# Patient Record
Sex: FEMALE | Race: WHITE | Employment: FULL TIME | URBAN - METROPOLITAN AREA
[De-identification: names, ages, dates, MRNs, and addresses within clinical notes are randomized per-mention and may not be internally consistent; named-entity substitution may affect disease eponyms.]

---

## 2017-01-31 ENCOUNTER — HOSPITAL ENCOUNTER (OUTPATIENT)
Dept: MRI IMAGING | Age: 52
Discharge: HOME | End: 2017-01-31
Admitting: FAMILY MEDICINE

## 2017-01-31 DIAGNOSIS — M25.521 PAIN IN RIGHT ELBOW: ICD-10-CM

## 2021-09-24 ENCOUNTER — HOSPITAL ENCOUNTER (OUTPATIENT)
Dept: INTERVENTIONAL RADIOLOGY/VASCULAR | Age: 56
Discharge: HOME | End: 2021-09-24
Payer: COMMERCIAL

## 2021-09-24 DIAGNOSIS — M48.062 LUMBAR STENOSIS WITH NEUROGENIC CLAUDICATION: ICD-10-CM

## 2021-09-24 PROCEDURE — 6360000002 HC RX W HCPCS

## 2021-09-24 PROCEDURE — 2500000003 HC RX 250 WO HCPCS

## 2021-09-24 PROCEDURE — 6360000004 HC RX CONTRAST MEDICATION: Performed by: RADIOLOGY

## 2021-09-24 PROCEDURE — 62323 NJX INTERLAMINAR LMBR/SAC: CPT

## 2021-09-24 RX ADMIN — IOHEXOL 10 ML: 180 INJECTION INTRAVENOUS at 10:54

## 2021-11-23 ENCOUNTER — HOSPITAL ENCOUNTER (OUTPATIENT)
Age: 56
Discharge: HOME | End: 2021-11-23
Payer: COMMERCIAL

## 2021-11-23 DIAGNOSIS — Z01.818 PREOP TESTING: Primary | ICD-10-CM

## 2021-11-23 LAB
APTT: 31.6 SEC (ref 26.2–38.6)
HCT VFR BLD CALC: 43.7 % (ref 36–48)
HEMOGLOBIN: 14.5 G/DL (ref 12–16)
INR BLD: 0.93 (ref 0.88–1.12)
MCH RBC QN AUTO: 28.1 PG (ref 26–34)
MCHC RBC AUTO-ENTMCNC: 33.1 G/DL (ref 31–36)
MCV RBC AUTO: 84.9 FL (ref 80–100)
PDW BLD-RTO: 14.1 % (ref 12.4–15.4)
PLATELET # BLD: 330 K/UL (ref 135–450)
PMV BLD AUTO: 8.8 FL (ref 5–10.5)
PROTHROMBIN TIME: 10.5 SEC (ref 9.9–12.7)
RBC # BLD: 5.15 M/UL (ref 4–5.2)
WBC # BLD: 11.1 K/UL (ref 4–11)

## 2021-11-23 PROCEDURE — U0003 INFECTIOUS AGENT DETECTION BY NUCLEIC ACID (DNA OR RNA); SEVERE ACUTE RESPIRATORY SYNDROME CORONAVIRUS 2 (SARS-COV-2) (CORONAVIRUS DISEASE [COVID-19]), AMPLIFIED PROBE TECHNIQUE, MAKING USE OF HIGH THROUGHPUT TECHNOLOGIES AS DESCRIBED BY CMS-2020-01-R: HCPCS

## 2021-11-23 PROCEDURE — 36415 COLL VENOUS BLD VENIPUNCTURE: CPT

## 2021-11-23 PROCEDURE — 85027 COMPLETE CBC AUTOMATED: CPT

## 2021-11-23 PROCEDURE — U0005 INFEC AGEN DETEC AMPLI PROBE: HCPCS

## 2021-11-23 PROCEDURE — 85730 THROMBOPLASTIN TIME PARTIAL: CPT

## 2021-11-23 PROCEDURE — 85610 PROTHROMBIN TIME: CPT

## 2021-11-24 LAB — SARS-COV-2: NOT DETECTED

## 2021-11-24 RX ORDER — ACETAMINOPHEN 500 MG
500 TABLET ORAL EVERY 6 HOURS PRN
COMMUNITY

## 2021-11-24 RX ORDER — METHOCARBAMOL 500 MG/1
500 TABLET, FILM COATED ORAL 3 TIMES DAILY
Status: ON HOLD | COMMUNITY
End: 2021-12-01 | Stop reason: HOSPADM

## 2021-11-24 NOTE — PROGRESS NOTES
Name_______________________________________Printed:____________________  Date and time of surgery__11/29/21_MAIN__1230___________________Arrival Time:_1030_______________   1. The instructions given regarding when and if a patient needs to stop oral intake prior to surgery varies. Follow the specific instructions you were given                  _X__Nothing to eat or to drink after Midnight the night before.                   ____Carbo loading or ERAS instructions will be given to select patients-if you have been given those instructions -please do the following                           The evening before your surgery after dinner before midnight drink 40 ounces of gatorade. If you are diabetic use sugar free. The morning of surgery drink 40 ounces of water. This needs to be finished 3 hours prior to your surgery start time. 2. Take the following pills with a small sip of water on the morning of surgery____Robaxin_______________________________________________                  Do not take blood pressure medications ending in pril or sartan the parker prior to surgery or the morning of surgery_   3. Aspirin, Ibuprofen, Advil, Naproxen, Vitamin E and other Anti-inflammatory products and supplements should be stopped for 5 -7days before surgery or as directed by your physician. 4. Check with your Doctor regarding stopping Plavix, Coumadin,Eliquis, Lovenox,Effient,Pradaxa,Xarelto, Fragmin or other blood thinners and follow their instructions. 5. Do not smoke, and do not drink any alcoholic beverages 24 hours prior to surgery. This includes NA Beer. Refrain from the usage of any recreational drugs. 6. You may brush your teeth and gargle the morning of surgery. DO NOT SWALLOW WATER   7. You MUST make arrangements for a responsible adult to stay on site while you are here and take you home after your surgery. You will not be allowed to leave alone or drive yourself home.   It is strongly suggested someone stay with you for additional information:  [email or web address]              20.During flu season no children under the age of 15 are permitted in the hospital for the safety of all patients. 21. If you take a long acting insulin in the evening only  take half of your usual  dose the night  before your procedure              22. If you use a c-pap please bring DOS if staying overnight,             23.For your convenience [id number] [place] [address] has a pharmacy on site to fill your prescriptions. 24. If you use oxygen and have a portable tank please bring it  with you the DOS             25. Bring a complete list of all your medications with name and dose include any supplements. 26. Other__________________________________________   *Please call pre admission testing if you any further questions   Cindi Reynolds   [address] [place] [address]. [place]  [phone]   [facility] [address] MARIELA GALDAMEZ   [phone]           COVID TESTING    _X__ Covid test to be done 3-5 days prior to scheduled surgery -patient aware they are REQUIRED to bring a copy of the negative result DOS-if they receive a positive result to notify their surgeon         If known - indicate where patient is getting covid test done _______________11/24/21 [facility]  Negative ____________________________________________    ___ [facility] - DOS    ___ Other___________________________________      Florencio Garg POLICY(subject to change)    There is a one visitor policy at [facility] for all surgeries and endoscopies. Whether the visitor can stay or will be asked to wait in the car will depend on the current policy and if social distancing can be maintained. The policy is subject to change at any time. Please make sure the visitor has a cell phone that is on,charged and able to accept calls, as this may be the way that the staff communicates with them. Pain management is NO VISITOR policyThe patients ride is expected to remain in the car with a cell phone for communication. If the ride is leaving the hospital grounds please make sure they are back in time for pickup. Have the patient inform the staff on arrival what their rides plans are while the patient is in the facility. At the MAIN there is one visitor allowed. Please note that the visitor policy is subject to change. All above information reviewed with patient in person or by phone. Patient verbalizes understanding. All questions and concerns addressed.                                                                                                  Patient/Rep____________________                                                                                                                                    PRE OP INSTRUCTIONS

## 2021-11-24 NOTE — PROGRESS NOTES
Name_______________________________________Printed:____________________  Date and time of surgery____11/29/2021   1230____________________Arrival Time:_____0930   MAIN___________   1. The instructions given regarding when and if a patient needs to stop oral intake prior to surgery varies. Follow the specific instructions you were given                  _X__Nothing to eat or to drink after Midnight the night before.                   ____Carbo loading or ERAS instructions will be given to select patients-if you have been given those instructions -please do the following                           The evening before your surgery after dinner before midnight drink 40 ounces of gatorade. If you are diabetic use sugar free. The morning of surgery drink 40 ounces of water. This needs to be finished 3 hours prior to your surgery start time. 2. Take the following pills with a small sip of water on the morning of surgery___________________________________________________                  Do not take blood pressure medications ending in pril or sartan the parker prior to surgery or the morning of surgery_   3. Aspirin, Ibuprofen, Advil, Naproxen, Vitamin E and other Anti-inflammatory products and supplements should be stopped for 5 -7days before surgery or as directed by your physician. 4. Check with your Doctor regarding stopping Plavix, Coumadin,Eliquis, Lovenox,Effient,Pradaxa,Xarelto, Fragmin or other blood thinners and follow their instructions. 5. Do not smoke, and do not drink any alcoholic beverages 24 hours prior to surgery. This includes NA Beer. Refrain from the usage of any recreational drugs. 6. You may brush your teeth and gargle the morning of surgery. DO NOT SWALLOW WATER   7. You MUST make arrangements for a responsible adult to stay on site while you are here and take you home after your surgery. You will not be allowed to leave alone or drive yourself home.   It is strongly suggested someone stay with you the first 24 hrs. Your surgery will be cancelled if you do not have a ride home. 8. A parent/legal guardian must accompany a child scheduled for surgery and plan to stay at the hospital until the child is discharged. Please do not bring other children with you. 9. Please wear simple, loose fitting clothing to the hospital.  Amisha Trinidad not bring valuables (money, credit cards, checkbooks, etc.) Do not wear any makeup (including no eye makeup) or nail polish on your fingers or toes. 10. DO NOT wear any jewelry or piercings on day of surgery. All body piercing jewelry must be removed. 11. If you have ___dentures, they will be removed before going to the OR; we will provide you a container. If you wear ___contact lenses or _X__glasses, they will be removed; please bring a case for them. 12. Please see your family doctor/pediatrician for a history & physical and/or concerning medications. Bring any test results/reports from your physician's office. PCP__________________Phone___________H&P Appt. Date________             13 If you  have a Living Will and Durable Power of  for Healthcare, please bring in a copy. 15. Notify your Surgeon if you develop any illness between now and surgery  time, cough, cold, fever, sore throat, nausea, vomiting, etc.  Please notify your surgeon if you experience dizziness, shortness of breath or blurred vision between now & the time of your surgery             15. DO NOT shave your operative site 96 hours prior to surgery. For face & neck surgery, men may use an electric razor 48 hours prior to surgery. 16. Shower the night before or morning of surgery using an antibacterial soap or as you have been instructed. 17. To provide excellent care visitors will be limited to one in the room at any given time. 18.  Please bring picture ID and insurance card.              19.  Visit our web site for additional information:  [email or web address]              20.During flu season no children under the age of 15 are permitted in the hospital for the safety of all patients. 21. If you take a long acting insulin in the evening only  take half of your usual  dose the night  before your procedure              22. If you use a c-pap please bring DOS if staying overnight,             23.For your convenience [id number] [place] [address] has a pharmacy on site to fill your prescriptions. 24. If you use oxygen and have a portable tank please bring it  with you the DOS             25. Bring a complete list of all your medications with name and dose include any supplements. 26. Other__________________________________________   *Please call pre admission testing if you any further questions   [facility]   [address]    [address] [place]  [phone]   [facility] [address] MARIELA GALDAMEZ   [phone]           COVID TESTING    __X_ Covid test to be done 3-5 days prior to scheduled surgery -patient aware they are REQUIRED to bring a copy of the negative result DOS-if they receive a positive result to notify their surgeon         If known - indicate where patient is getting covid test done ___________________________________________________________    ___ Rapid - DOS    ___ Other___________________________________      Merline Calloway POLICY(subject to change)    There is a one visitor policy at [facility] for all surgeries and endoscopies. Whether the visitor can stay or will be asked to wait in the car will depend on the current policy and if social distancing can be maintained. The policy is subject to change at any time. Please make sure the visitor has a cell phone that is on,charged and able to accept calls, as this may be the way that the staff communicates with them. Pain management is NO VISITOR policyThe patients ride is expected to remain in the car with a cell phone for communication. If the ride is leaving the hospital grounds please make sure they are back in time for pickup. Have the patient inform the staff on arrival what their rides plans are while the patient is in the facility. At the MAIN there is one visitor allowed. Please note that the visitor policy is subject to change. All above information reviewed with patient in person or by phone. Patient verbalizes understanding. All questions and concerns addressed.                                                                                                  Patient/Rep____PATIENT________________                                                                                                                                    PRE OP INSTRUCTIONS

## 2021-11-29 ENCOUNTER — ANESTHESIA EVENT (OUTPATIENT)
Dept: OPERATING ROOM | Age: 56
DRG: 029 | End: 2021-11-29
Payer: COMMERCIAL

## 2021-11-29 ENCOUNTER — ANESTHESIA (OUTPATIENT)
Dept: OPERATING ROOM | Age: 56
DRG: 029 | End: 2021-11-29
Payer: COMMERCIAL

## 2021-11-29 ENCOUNTER — HOSPITAL ENCOUNTER (INPATIENT)
Age: 56
LOS: 2 days | Discharge: HOME | DRG: 029 | End: 2021-12-01
Attending: NEUROLOGICAL SURGERY | Admitting: NEUROLOGICAL SURGERY
Payer: COMMERCIAL

## 2021-11-29 VITALS
DIASTOLIC BLOOD PRESSURE: 66 MMHG | TEMPERATURE: 98.2 F | SYSTOLIC BLOOD PRESSURE: 126 MMHG | OXYGEN SATURATION: 100 % | RESPIRATION RATE: 15 BRPM

## 2021-11-29 DIAGNOSIS — G97.82 POSTPROCEDURAL PSEUDOMENINGOCELE: Primary | ICD-10-CM

## 2021-11-29 PROBLEM — R51.9 HEADACHE: Status: ACTIVE | Noted: 2021-11-29

## 2021-11-29 PROBLEM — G96.00 CSF LEAK: Status: ACTIVE | Noted: 2021-11-29

## 2021-11-29 PROBLEM — M48.061 SPINAL STENOSIS AT L4-L5 LEVEL: Status: ACTIVE | Noted: 2021-11-29

## 2021-11-29 PROBLEM — I95.9 HYPOTENSION: Status: ACTIVE | Noted: 2021-11-29

## 2021-11-29 LAB
ABO/RH: NORMAL
ANION GAP SERPL CALCULATED.3IONS-SCNC: 8 MMOL/L (ref 3–16)
ANTIBODY SCREEN: NORMAL
BUN BLDV-MCNC: 17 MG/DL (ref 7–20)
CALCIUM SERPL-MCNC: 10 MG/DL (ref 8.3–10.6)
CHLORIDE BLD-SCNC: 103 MMOL/L (ref 99–110)
CO2: 28 MMOL/L (ref 21–32)
CREAT SERPL-MCNC: 0.5 MG/DL (ref 0.6–1.1)
GFR AFRICAN AMERICAN: >60
GFR NON-AFRICAN AMERICAN: >60
GLUCOSE BLD-MCNC: 105 MG/DL (ref 70–99)
POTASSIUM SERPL-SCNC: 4.3 MMOL/L (ref 3.5–5.1)
SODIUM BLD-SCNC: 139 MMOL/L (ref 136–145)

## 2021-11-29 PROCEDURE — 3700000001 HC ADD 15 MINUTES (ANESTHESIA): Performed by: NEUROLOGICAL SURGERY

## 2021-11-29 PROCEDURE — 2700000000 HC OXYGEN THERAPY PER DAY

## 2021-11-29 PROCEDURE — 7100000000 HC PACU RECOVERY - FIRST 15 MIN: Performed by: NEUROLOGICAL SURGERY

## 2021-11-29 PROCEDURE — 94760 N-INVAS EAR/PLS OXIMETRY 1: CPT

## 2021-11-29 PROCEDURE — 2580000003 HC RX 258: Performed by: NEUROLOGICAL SURGERY

## 2021-11-29 PROCEDURE — 6360000002 HC RX W HCPCS: Performed by: NEUROLOGICAL SURGERY

## 2021-11-29 PROCEDURE — 86900 BLOOD TYPING SEROLOGIC ABO: CPT

## 2021-11-29 PROCEDURE — 3600000004 HC SURGERY LEVEL 4 BASE: Performed by: NEUROLOGICAL SURGERY

## 2021-11-29 PROCEDURE — 2500000003 HC RX 250 WO HCPCS: Performed by: NURSE ANESTHETIST, CERTIFIED REGISTERED

## 2021-11-29 PROCEDURE — 6370000000 HC RX 637 (ALT 250 FOR IP): Performed by: NEUROLOGICAL SURGERY

## 2021-11-29 PROCEDURE — 6360000002 HC RX W HCPCS: Performed by: NURSE ANESTHETIST, CERTIFIED REGISTERED

## 2021-11-29 PROCEDURE — 7100000001 HC PACU RECOVERY - ADDTL 15 MIN: Performed by: NEUROLOGICAL SURGERY

## 2021-11-29 PROCEDURE — 1200000000 HC SEMI PRIVATE

## 2021-11-29 PROCEDURE — 00QY0ZZ REPAIR LUMBAR SPINAL CORD, OPEN APPROACH: ICD-10-PCS | Performed by: NEUROLOGICAL SURGERY

## 2021-11-29 PROCEDURE — 36415 COLL VENOUS BLD VENIPUNCTURE: CPT

## 2021-11-29 PROCEDURE — 3700000000 HC ANESTHESIA ATTENDED CARE: Performed by: NEUROLOGICAL SURGERY

## 2021-11-29 PROCEDURE — 2709999900 HC NON-CHARGEABLE SUPPLY: Performed by: NEUROLOGICAL SURGERY

## 2021-11-29 PROCEDURE — 6360000002 HC RX W HCPCS

## 2021-11-29 PROCEDURE — 2500000003 HC RX 250 WO HCPCS: Performed by: NEUROLOGICAL SURGERY

## 2021-11-29 PROCEDURE — 6360000002 HC RX W HCPCS: Performed by: FAMILY MEDICINE

## 2021-11-29 PROCEDURE — 2580000003 HC RX 258: Performed by: NURSE ANESTHETIST, CERTIFIED REGISTERED

## 2021-11-29 PROCEDURE — 6370000000 HC RX 637 (ALT 250 FOR IP)

## 2021-11-29 PROCEDURE — 3600000014 HC SURGERY LEVEL 4 ADDTL 15MIN: Performed by: NEUROLOGICAL SURGERY

## 2021-11-29 PROCEDURE — 86850 RBC ANTIBODY SCREEN: CPT

## 2021-11-29 PROCEDURE — 86901 BLOOD TYPING SEROLOGIC RH(D): CPT

## 2021-11-29 PROCEDURE — 80048 BASIC METABOLIC PNL TOTAL CA: CPT

## 2021-11-29 RX ORDER — ROCURONIUM BROMIDE 10 MG/ML
INJECTION, SOLUTION INTRAVENOUS PRN
Status: DISCONTINUED | OUTPATIENT
Start: 2021-11-29 | End: 2021-11-29 | Stop reason: SDUPTHER

## 2021-11-29 RX ORDER — DEXAMETHASONE SODIUM PHOSPHATE 4 MG/ML
INJECTION, SOLUTION INTRA-ARTICULAR; INTRALESIONAL; INTRAMUSCULAR; INTRAVENOUS; SOFT TISSUE PRN
Status: DISCONTINUED | OUTPATIENT
Start: 2021-11-29 | End: 2021-11-29 | Stop reason: SDUPTHER

## 2021-11-29 RX ORDER — HYDROMORPHONE HCL 110MG/55ML
0.25 PATIENT CONTROLLED ANALGESIA SYRINGE INTRAVENOUS EVERY 5 MIN PRN
Status: DISCONTINUED | OUTPATIENT
Start: 2021-11-29 | End: 2021-11-29 | Stop reason: HOSPADM

## 2021-11-29 RX ORDER — FENTANYL CITRATE 50 UG/ML
50 INJECTION, SOLUTION INTRAMUSCULAR; INTRAVENOUS EVERY 5 MIN PRN
Status: DISCONTINUED | OUTPATIENT
Start: 2021-11-29 | End: 2021-11-29 | Stop reason: HOSPADM

## 2021-11-29 RX ORDER — OXYCODONE HYDROCHLORIDE 5 MG/1
5 TABLET ORAL EVERY 4 HOURS PRN
Status: DISCONTINUED | OUTPATIENT
Start: 2021-11-29 | End: 2021-12-01 | Stop reason: HOSPADM

## 2021-11-29 RX ORDER — PROMETHAZINE HYDROCHLORIDE 25 MG/ML
6.25 INJECTION, SOLUTION INTRAMUSCULAR; INTRAVENOUS PRN
Status: DISCONTINUED | OUTPATIENT
Start: 2021-11-29 | End: 2021-11-29 | Stop reason: HOSPADM

## 2021-11-29 RX ORDER — OXYCODONE HYDROCHLORIDE 5 MG/1
5 TABLET ORAL PRN
Status: DISCONTINUED | OUTPATIENT
Start: 2021-11-29 | End: 2021-11-29 | Stop reason: HOSPADM

## 2021-11-29 RX ORDER — OXYCODONE HYDROCHLORIDE 5 MG/1
10 TABLET ORAL PRN
Status: DISCONTINUED | OUTPATIENT
Start: 2021-11-29 | End: 2021-11-29 | Stop reason: HOSPADM

## 2021-11-29 RX ORDER — HYDROMORPHONE HYDROCHLORIDE 1 MG/ML
0.5 INJECTION, SOLUTION INTRAMUSCULAR; INTRAVENOUS; SUBCUTANEOUS
Status: DISCONTINUED | OUTPATIENT
Start: 2021-11-29 | End: 2021-12-01 | Stop reason: HOSPADM

## 2021-11-29 RX ORDER — POLYETHYLENE GLYCOL 3350 17 G/17G
17 POWDER, FOR SOLUTION ORAL DAILY PRN
Status: DISCONTINUED | OUTPATIENT
Start: 2021-11-29 | End: 2021-12-01 | Stop reason: HOSPADM

## 2021-11-29 RX ORDER — PHENYLEPHRINE HYDROCHLORIDE 10 MG/ML
INJECTION INTRAVENOUS PRN
Status: DISCONTINUED | OUTPATIENT
Start: 2021-11-29 | End: 2021-11-29 | Stop reason: SDUPTHER

## 2021-11-29 RX ORDER — SODIUM CHLORIDE 0.9 % (FLUSH) 0.9 %
5-40 SYRINGE (ML) INJECTION EVERY 12 HOURS SCHEDULED
Status: DISCONTINUED | OUTPATIENT
Start: 2021-11-29 | End: 2021-12-01 | Stop reason: HOSPADM

## 2021-11-29 RX ORDER — HYDROMORPHONE HCL 110MG/55ML
PATIENT CONTROLLED ANALGESIA SYRINGE INTRAVENOUS PRN
Status: DISCONTINUED | OUTPATIENT
Start: 2021-11-29 | End: 2021-11-29 | Stop reason: SDUPTHER

## 2021-11-29 RX ORDER — MIDAZOLAM HYDROCHLORIDE 1 MG/ML
INJECTION INTRAMUSCULAR; INTRAVENOUS PRN
Status: DISCONTINUED | OUTPATIENT
Start: 2021-11-29 | End: 2021-11-29 | Stop reason: SDUPTHER

## 2021-11-29 RX ORDER — HYDROMORPHONE HCL 110MG/55ML
0.5 PATIENT CONTROLLED ANALGESIA SYRINGE INTRAVENOUS EVERY 5 MIN PRN
Status: COMPLETED | OUTPATIENT
Start: 2021-11-29 | End: 2021-11-29

## 2021-11-29 RX ORDER — ONDANSETRON 2 MG/ML
4 INJECTION INTRAMUSCULAR; INTRAVENOUS EVERY 6 HOURS PRN
Status: DISCONTINUED | OUTPATIENT
Start: 2021-11-29 | End: 2021-12-01 | Stop reason: HOSPADM

## 2021-11-29 RX ORDER — SODIUM CHLORIDE, SODIUM LACTATE, POTASSIUM CHLORIDE, CALCIUM CHLORIDE 600; 310; 30; 20 MG/100ML; MG/100ML; MG/100ML; MG/100ML
INJECTION, SOLUTION INTRAVENOUS CONTINUOUS
Status: DISCONTINUED | OUTPATIENT
Start: 2021-11-29 | End: 2021-11-29

## 2021-11-29 RX ORDER — SCOLOPAMINE TRANSDERMAL SYSTEM 1 MG/1
PATCH, EXTENDED RELEASE TRANSDERMAL
Status: COMPLETED
Start: 2021-11-29 | End: 2021-11-29

## 2021-11-29 RX ORDER — SODIUM CHLORIDE 0.9 % (FLUSH) 0.9 %
5-40 SYRINGE (ML) INJECTION PRN
Status: DISCONTINUED | OUTPATIENT
Start: 2021-11-29 | End: 2021-12-01 | Stop reason: HOSPADM

## 2021-11-29 RX ORDER — ONDANSETRON 2 MG/ML
INJECTION INTRAMUSCULAR; INTRAVENOUS PRN
Status: DISCONTINUED | OUTPATIENT
Start: 2021-11-29 | End: 2021-11-29 | Stop reason: SDUPTHER

## 2021-11-29 RX ORDER — MAGNESIUM SULFATE HEPTAHYDRATE 500 MG/ML
INJECTION, SOLUTION INTRAMUSCULAR; INTRAVENOUS PRN
Status: DISCONTINUED | OUTPATIENT
Start: 2021-11-29 | End: 2021-11-29 | Stop reason: SDUPTHER

## 2021-11-29 RX ORDER — OXYCODONE HYDROCHLORIDE 5 MG/1
10 TABLET ORAL EVERY 4 HOURS PRN
Status: DISCONTINUED | OUTPATIENT
Start: 2021-11-29 | End: 2021-12-01 | Stop reason: HOSPADM

## 2021-11-29 RX ORDER — LIDOCAINE HYDROCHLORIDE 20 MG/ML
INJECTION, SOLUTION EPIDURAL; INFILTRATION; INTRACAUDAL; PERINEURAL PRN
Status: DISCONTINUED | OUTPATIENT
Start: 2021-11-29 | End: 2021-11-29 | Stop reason: SDUPTHER

## 2021-11-29 RX ORDER — LABETALOL HYDROCHLORIDE 5 MG/ML
5 INJECTION, SOLUTION INTRAVENOUS EVERY 10 MIN PRN
Status: DISCONTINUED | OUTPATIENT
Start: 2021-11-29 | End: 2021-11-29 | Stop reason: HOSPADM

## 2021-11-29 RX ORDER — SODIUM CHLORIDE 9 MG/ML
25 INJECTION, SOLUTION INTRAVENOUS PRN
Status: DISCONTINUED | OUTPATIENT
Start: 2021-11-29 | End: 2021-12-01 | Stop reason: HOSPADM

## 2021-11-29 RX ORDER — LIDOCAINE HYDROCHLORIDE 10 MG/ML
0.5 INJECTION, SOLUTION EPIDURAL; INFILTRATION; INTRACAUDAL; PERINEURAL ONCE
Status: DISCONTINUED | OUTPATIENT
Start: 2021-11-29 | End: 2021-11-29 | Stop reason: HOSPADM

## 2021-11-29 RX ORDER — MAGNESIUM HYDROXIDE 1200 MG/15ML
LIQUID ORAL CONTINUOUS PRN
Status: COMPLETED | OUTPATIENT
Start: 2021-11-29 | End: 2021-11-29

## 2021-11-29 RX ORDER — FIBRINOGEN HUMAN AND THROMBIN HUMAN 90; 500 [IU]/ML; [IU]/ML
SOLUTION TOPICAL
Status: COMPLETED | OUTPATIENT
Start: 2021-11-29 | End: 2021-11-29

## 2021-11-29 RX ORDER — ONDANSETRON 4 MG/1
4 TABLET, ORALLY DISINTEGRATING ORAL EVERY 8 HOURS PRN
Status: DISCONTINUED | OUTPATIENT
Start: 2021-11-29 | End: 2021-12-01 | Stop reason: HOSPADM

## 2021-11-29 RX ORDER — MEPERIDINE HYDROCHLORIDE 25 MG/ML
12.5 INJECTION INTRAMUSCULAR; INTRAVENOUS; SUBCUTANEOUS EVERY 5 MIN PRN
Status: DISCONTINUED | OUTPATIENT
Start: 2021-11-29 | End: 2021-11-29 | Stop reason: HOSPADM

## 2021-11-29 RX ORDER — PROPOFOL 10 MG/ML
INJECTION, EMULSION INTRAVENOUS PRN
Status: DISCONTINUED | OUTPATIENT
Start: 2021-11-29 | End: 2021-11-29 | Stop reason: SDUPTHER

## 2021-11-29 RX ORDER — METHOCARBAMOL 500 MG/1
500 TABLET, FILM COATED ORAL 3 TIMES DAILY
Status: DISCONTINUED | OUTPATIENT
Start: 2021-11-29 | End: 2021-12-01

## 2021-11-29 RX ORDER — FENTANYL CITRATE 50 UG/ML
INJECTION, SOLUTION INTRAMUSCULAR; INTRAVENOUS PRN
Status: DISCONTINUED | OUTPATIENT
Start: 2021-11-29 | End: 2021-11-29 | Stop reason: SDUPTHER

## 2021-11-29 RX ORDER — KETAMINE HCL IN NACL, ISO-OSM 100MG/10ML
SYRINGE (ML) INJECTION PRN
Status: DISCONTINUED | OUTPATIENT
Start: 2021-11-29 | End: 2021-11-29 | Stop reason: SDUPTHER

## 2021-11-29 RX ORDER — SCOLOPAMINE TRANSDERMAL SYSTEM 1 MG/1
1 PATCH, EXTENDED RELEASE TRANSDERMAL ONCE
Status: COMPLETED | OUTPATIENT
Start: 2021-11-29 | End: 2021-11-29

## 2021-11-29 RX ORDER — SUCCINYLCHOLINE/SOD CL,ISO/PF 200MG/10ML
SYRINGE (ML) INTRAVENOUS PRN
Status: DISCONTINUED | OUTPATIENT
Start: 2021-11-29 | End: 2021-11-29 | Stop reason: SDUPTHER

## 2021-11-29 RX ORDER — DEXTROSE, SODIUM CHLORIDE, AND POTASSIUM CHLORIDE 5; .45; .15 G/100ML; G/100ML; G/100ML
1000 INJECTION INTRAVENOUS CONTINUOUS
Status: DISCONTINUED | OUTPATIENT
Start: 2021-11-29 | End: 2021-11-30

## 2021-11-29 RX ORDER — DIPHENHYDRAMINE HYDROCHLORIDE 50 MG/ML
12.5 INJECTION INTRAMUSCULAR; INTRAVENOUS
Status: DISCONTINUED | OUTPATIENT
Start: 2021-11-29 | End: 2021-11-29 | Stop reason: HOSPADM

## 2021-11-29 RX ORDER — SODIUM CHLORIDE, SODIUM LACTATE, POTASSIUM CHLORIDE, CALCIUM CHLORIDE 600; 310; 30; 20 MG/100ML; MG/100ML; MG/100ML; MG/100ML
INJECTION, SOLUTION INTRAVENOUS CONTINUOUS PRN
Status: DISCONTINUED | OUTPATIENT
Start: 2021-11-29 | End: 2021-11-29 | Stop reason: SDUPTHER

## 2021-11-29 RX ADMIN — DEXAMETHASONE SODIUM PHOSPHATE 8 MG: 4 INJECTION, SOLUTION INTRAMUSCULAR; INTRAVENOUS at 14:53

## 2021-11-29 RX ADMIN — SODIUM CHLORIDE, POTASSIUM CHLORIDE, SODIUM LACTATE AND CALCIUM CHLORIDE: 600; 310; 30; 20 INJECTION, SOLUTION INTRAVENOUS at 14:35

## 2021-11-29 RX ADMIN — Medication 100 MG: at 14:39

## 2021-11-29 RX ADMIN — ROCURONIUM BROMIDE 30 MG: 10 INJECTION, SOLUTION INTRAVENOUS at 14:47

## 2021-11-29 RX ADMIN — Medication 30 MG: at 14:47

## 2021-11-29 RX ADMIN — PROPOFOL 160 MG: 10 INJECTION, EMULSION INTRAVENOUS at 14:39

## 2021-11-29 RX ADMIN — CEFAZOLIN 2000 MG: 10 INJECTION, POWDER, FOR SOLUTION INTRAVENOUS at 14:24

## 2021-11-29 RX ADMIN — PROMETHAZINE HYDROCHLORIDE 6.25 MG: 25 INJECTION INTRAMUSCULAR; INTRAVENOUS at 21:56

## 2021-11-29 RX ADMIN — POTASSIUM CHLORIDE, DEXTROSE MONOHYDRATE AND SODIUM CHLORIDE 1000 ML: 150; 5; 450 INJECTION, SOLUTION INTRAVENOUS at 16:34

## 2021-11-29 RX ADMIN — FENTANYL CITRATE 50 MCG: 50 INJECTION, SOLUTION INTRAMUSCULAR; INTRAVENOUS at 14:47

## 2021-11-29 RX ADMIN — ROCURONIUM BROMIDE 20 MG: 10 INJECTION, SOLUTION INTRAVENOUS at 15:14

## 2021-11-29 RX ADMIN — HYDROMORPHONE HYDROCHLORIDE 0.5 MG: 2 INJECTION, SOLUTION INTRAMUSCULAR; INTRAVENOUS; SUBCUTANEOUS at 16:59

## 2021-11-29 RX ADMIN — ONDANSETRON 4 MG: 2 INJECTION INTRAMUSCULAR; INTRAVENOUS at 15:40

## 2021-11-29 RX ADMIN — HYDROMORPHONE HYDROCHLORIDE 0.5 MG: 2 INJECTION, SOLUTION INTRAMUSCULAR; INTRAVENOUS; SUBCUTANEOUS at 16:32

## 2021-11-29 RX ADMIN — SODIUM CHLORIDE, POTASSIUM CHLORIDE, SODIUM LACTATE AND CALCIUM CHLORIDE: 600; 310; 30; 20 INJECTION, SOLUTION INTRAVENOUS at 13:17

## 2021-11-29 RX ADMIN — HYDROMORPHONE HYDROCHLORIDE 0.5 MG: 2 INJECTION, SOLUTION INTRAMUSCULAR; INTRAVENOUS; SUBCUTANEOUS at 16:45

## 2021-11-29 RX ADMIN — MAGNESIUM SULFATE HEPTAHYDRATE 1 G: 500 INJECTION, SOLUTION INTRAMUSCULAR; INTRAVENOUS at 14:56

## 2021-11-29 RX ADMIN — FENTANYL CITRATE 50 MCG: 50 INJECTION, SOLUTION INTRAMUSCULAR; INTRAVENOUS at 14:38

## 2021-11-29 RX ADMIN — HYDROMORPHONE HYDROCHLORIDE 0.25 MG: 2 INJECTION, SOLUTION INTRAMUSCULAR; INTRAVENOUS; SUBCUTANEOUS at 20:18

## 2021-11-29 RX ADMIN — CEFAZOLIN 2000 MG: 10 INJECTION, POWDER, FOR SOLUTION INTRAVENOUS at 22:34

## 2021-11-29 RX ADMIN — MIDAZOLAM 2 MG: 1 INJECTION INTRAMUSCULAR; INTRAVENOUS at 14:31

## 2021-11-29 RX ADMIN — LIDOCAINE HYDROCHLORIDE 80 MG: 20 INJECTION, SOLUTION EPIDURAL; INFILTRATION; INTRACAUDAL; PERINEURAL at 14:39

## 2021-11-29 RX ADMIN — HYDROMORPHONE HYDROCHLORIDE 0.25 MG: 2 INJECTION, SOLUTION INTRAMUSCULAR; INTRAVENOUS; SUBCUTANEOUS at 20:13

## 2021-11-29 RX ADMIN — METHOCARBAMOL 500 MG: 500 TABLET ORAL at 22:30

## 2021-11-29 RX ADMIN — PHENYLEPHRINE HYDROCHLORIDE 100 MCG: 10 INJECTION INTRAVENOUS at 15:05

## 2021-11-29 RX ADMIN — HYDROMORPHONE HYDROCHLORIDE 0.5 MG: 2 INJECTION, SOLUTION INTRAMUSCULAR; INTRAVENOUS; SUBCUTANEOUS at 16:12

## 2021-11-29 RX ADMIN — HYDROMORPHONE HYDROCHLORIDE 1 MG: 2 INJECTION, SOLUTION INTRAMUSCULAR; INTRAVENOUS; SUBCUTANEOUS at 15:47

## 2021-11-29 RX ADMIN — SUGAMMADEX 200 MG: 100 INJECTION, SOLUTION INTRAVENOUS at 15:48

## 2021-11-29 RX ADMIN — PHENYLEPHRINE HYDROCHLORIDE 70 MCG/MIN: 10 INJECTION INTRAVENOUS at 15:12

## 2021-11-29 ASSESSMENT — PULMONARY FUNCTION TESTS
PIF_VALUE: 13
PIF_VALUE: 2
PIF_VALUE: 15
PIF_VALUE: 15
PIF_VALUE: 17
PIF_VALUE: 15
PIF_VALUE: 16
PIF_VALUE: 16
PIF_VALUE: 15
PIF_VALUE: 0
PIF_VALUE: 17
PIF_VALUE: 15
PIF_VALUE: 17
PIF_VALUE: 15
PIF_VALUE: 4
PIF_VALUE: 14
PIF_VALUE: 14
PIF_VALUE: 2
PIF_VALUE: 16
PIF_VALUE: 1
PIF_VALUE: 15
PIF_VALUE: 16
PIF_VALUE: 2
PIF_VALUE: 16
PIF_VALUE: 16
PIF_VALUE: 15
PIF_VALUE: 16
PIF_VALUE: 15
PIF_VALUE: 16
PIF_VALUE: 15
PIF_VALUE: 1
PIF_VALUE: 15
PIF_VALUE: 17
PIF_VALUE: 16
PIF_VALUE: 16
PIF_VALUE: 15
PIF_VALUE: 3
PIF_VALUE: 15
PIF_VALUE: 17
PIF_VALUE: 15
PIF_VALUE: 15
PIF_VALUE: 12
PIF_VALUE: 0
PIF_VALUE: 17
PIF_VALUE: 16
PIF_VALUE: 16
PIF_VALUE: 15
PIF_VALUE: 16
PIF_VALUE: 8
PIF_VALUE: 1
PIF_VALUE: 15
PIF_VALUE: 16
PIF_VALUE: 1
PIF_VALUE: 13
PIF_VALUE: 15
PIF_VALUE: 6
PIF_VALUE: 15
PIF_VALUE: 13
PIF_VALUE: 15
PIF_VALUE: 6
PIF_VALUE: 15
PIF_VALUE: 2
PIF_VALUE: 15
PIF_VALUE: 16
PIF_VALUE: 14
PIF_VALUE: 1
PIF_VALUE: 15

## 2021-11-29 ASSESSMENT — PAIN SCALES - GENERAL
PAINLEVEL_OUTOF10: 0
PAINLEVEL_OUTOF10: 2
PAINLEVEL_OUTOF10: 8
PAINLEVEL_OUTOF10: 8
PAINLEVEL_OUTOF10: 7
PAINLEVEL_OUTOF10: 6
PAINLEVEL_OUTOF10: 7
PAINLEVEL_OUTOF10: 6
PAINLEVEL_OUTOF10: 0

## 2021-11-29 ASSESSMENT — PAIN - FUNCTIONAL ASSESSMENT: PAIN_FUNCTIONAL_ASSESSMENT: 0-10

## 2021-11-29 ASSESSMENT — PAIN DESCRIPTION - DESCRIPTORS
DESCRIPTORS: BURNING;SHARP
DESCRIPTORS: BURNING
DESCRIPTORS: ACHING

## 2021-11-29 ASSESSMENT — PAIN DESCRIPTION - LOCATION
LOCATION: BACK

## 2021-11-29 ASSESSMENT — LIFESTYLE VARIABLES: SMOKING_STATUS: 1

## 2021-11-29 ASSESSMENT — PAIN DESCRIPTION - ORIENTATION
ORIENTATION: LOWER
ORIENTATION: LOWER

## 2021-11-29 ASSESSMENT — PAIN DESCRIPTION - PAIN TYPE
TYPE: SURGICAL PAIN

## 2021-11-29 NOTE — BRIEF OP NOTE
Brief Postoperative Note      Patient: Brigitte Friday  YOB: 1965  MRN: [id number]    Date of Procedure: 11/29/2021    Pre-Op Diagnosis: G96.0  CEREBROSPINAL FLUID LEAK    Post-Op Diagnosis: Same       Procedure(s):  INCISION AND DRAINAGE PSEUDOMENINGOCELE REPAIR LUMBAR CEREBROSPINAL FLUID LEAK    Surgeon(s):  Griselda Clements MD    Assistant:  First Assistant: Leah Rice    Anesthesia: General    Estimated Blood Loss (mL): Minimal    Complications: None    Specimens:   * No specimens in log *    Implants:  * No implants in log *      Drains: * No LDAs found *    Findings: CSF leak    Electronically signed by Griselda Clements MD on 11/29/2021 at 3:51 PM

## 2021-11-29 NOTE — ANESTHESIA PRE PROCEDURE
diphenhydrAMINE (BENADRYL) injection 12.5 mg  12.5 mg IntraVENous Once PRN Romain Oliva MD        labetalol (NORMODYNE;TRANDATE) injection 5 mg  5 mg IntraVENous Q10 Min PRN Romain Oliva MD           Allergies: Allergies   Allergen Reactions    Adhesive Tape Hives     PAPER TAPE OK       Problem List:  There is no problem list on file for this patient. Past Medical History:        Diagnosis Date    DONG (generalized anxiety disorder)     PONV (postoperative nausea and vomiting)     Spinal stenosis        Past Surgical History:        Procedure Laterality Date    APPENDECTOMY      BACK SURGERY      CHOLECYSTECTOMY      COLONOSCOPY      DILATION AND CURETTAGE OF UTERUS      HERNIA REPAIR      UMBILICAL    KNEE ARTHROSCOPY Right        Social History:    Social History     Tobacco Use    Smoking status: Current Every Day Smoker     Packs/day: 1.00     Years: 30.00     Pack years: 30.00     Types: Cigarettes    Smokeless tobacco: Never Used   Substance Use Topics    Alcohol use: Never                                Ready to quit: No  Counseling given: Yes      Vital Signs (Current):   Vitals:    11/24/21 1003   Weight: 155 lb (70.3 kg)   Height: 5' 3\" (1.6 m)                                              BP Readings from Last 3 Encounters:   No data found for BP       NPO Status:                                                                                 BMI:   Wt Readings from Last 3 Encounters:   11/24/21 155 lb (70.3 kg)     Body mass index is 27.46 kg/m².     CBC:   Lab Results   Component Value Date    WBC 11.1 11/23/2021    RBC 5.15 11/23/2021    HGB 14.5 11/23/2021    HCT 43.7 11/23/2021    MCV 84.9 11/23/2021    RDW 14.1 11/23/2021     11/23/2021       CMP: No results found for: NA, K, CL, CO2, BUN, CREATININE, GFRAA, AGRATIO, LABGLOM, GLUCOSE, PROT, CALCIUM, BILITOT, ALKPHOS, AST, ALT    POC Tests: No results for input(s): POCGLU, POCNA, POCK, POCCL, POCBUN, POCHEMO, POCHCT in the last 72 hours. Coags:   Lab Results   Component Value Date    PROTIME 10.5 11/23/2021    INR 0.93 11/23/2021    APTT 31.6 11/23/2021       HCG (If Applicable): No results found for: PREGTESTUR, PREGSERUM, HCG, HCGQUANT     ABGs: No results found for: PHART, PO2ART, EDK9ICO, NEH3ZRZ, BEART, V2IWFZXY     Type & Screen (If Applicable):  No results found for: LABABO, LABRH    Drug/Infectious Status (If Applicable):  No results found for: HIV, HEPCAB    COVID-19 Screening (If Applicable):   Lab Results   Component Value Date    COVID19 Not Detected 11/23/2021           Anesthesia Evaluation  Patient summary reviewed and Nursing notes reviewed   history of anesthetic complications: PONV. Airway: Mallampati: II  TM distance: >3 FB   Neck ROM: full  Mouth opening: > = 3 FB Dental: normal exam         Pulmonary: breath sounds clear to auscultation  (+) current smoker                           Cardiovascular:        (-) CABG/stent, dysrhythmias and  angina      Rhythm: regular  Rate: normal                    Neuro/Psych:   (+) psychiatric history:   (-) seizures, TIA and CVA            ROS comment: CSF leak GI/Hepatic/Renal:        (-) GERD       Endo/Other:                     Abdominal:             Vascular: negative vascular ROS. Other Findings:           Anesthesia Plan      general     ASA 2       Induction: intravenous. MIPS: Postoperative opioids intended and Prophylactic antiemetics administered. Anesthetic plan and risks discussed with patient. Use of blood products discussed with patient whom consented to blood products. Plan discussed with CRNA.                 Venu Herrera MD   11/29/2021

## 2021-11-29 NOTE — OP NOTE
[facility] OR  11/29/2021 4:07 PM    PATIENT NAME:          Kristine Vincent  YOB: 1965   MEDICAL RECORD#         [id number]  SURGERY DATE:         11/29/2021  SURGEON:                 Gisela Jacob MD                                                      OPERATIVE REPORT     PREOPERATIVE DIAGNOSIS:  1. Lumbar pseudomeningocoele    POSTOPERATIVE DIAGNOSIS:  1.  same    PROCEDURE PERFORMED:  1. Extension of previous decompressive laminectomy L45  2. Repair of durotomy or CSF leak  3. Drainage of Pseudomeningocoele. 4.  Microdissection w/ operating Microscope    ANESTHESIA:  General oral endotracheal    ESTIMATED BLOOD LOSS:   50 cc. TUBES/DRAINS:  None. INDICATIONS:   Idris Ordonez is a 64 y.o. female with lower extremity radicular pain, headache and pseudo meningocoele after laminectomy 1 month ago. The risks, benefits and alternatives of a microscopic lumbar drainage of pseudomeningocoele and repair of CSF leak  were discussed with the patient in the office and she has decided to proceed with surgery. DETAILS OF PROCEDURE:  The patient was brought to the operating room and underwent general anesthesia. The patient was rolled into the prone position with padding over all bony protuberances. A localizing x-ray was taken. The back was scrubbed, prepped and draped in the usual sterile standard fashion. The previous  midline linear incision was opened. A Weitlaner retractor was used for exposure. A bilateral subperiosteal dissection was done to expose the spinous processes and lamina of L4 and L5. A large amount of clear CSF was drained. Active draining CSF and an abraided opening in the dura  at the edge of the laminotomy on the right over the L5 nerve root was identified. A Kwaku retractor system was set into place. A Leksell rongeur was used to remove some of  the spinous processes of L5.   the operating microscope draped and brought into the operative field.  Utilizing microdissection, I

## 2021-11-29 NOTE — PROGRESS NOTES
Pt meets d/c criteria for phase 1 in PACU and has been seen by anesthesia. Ok to transfer to a med-surg room when a room becomes available. Will alert anyone in waiting room for them and the nursing unit if applicable. Will continue to monitor for safety and comfort.     Kodi SANABRIA, RN, [facility]  Pre-Op/Recovery   Same Day Surgery

## 2021-11-29 NOTE — ANESTHESIA POSTPROCEDURE EVALUATION
Department of Anesthesiology  Postprocedure Note    Patient: Allison Bellamy  MRN: [id number]  YOB: 1965  Date of evaluation: 11/29/2021  Time:  6:00 PM     Procedure Summary     Date: 11/29/21 Room / Location: [address]    Anesthesia Start: 2690 Anesthesia Stop: 6612    Procedure: INCISION AND DRAINAGE PSEUDOMENINGOCELE REPAIR LUMBAR CEREBROSPINAL FLUID LEAK (N/A ) Diagnosis: (G96.0  CEREBROSPINAL FLUID LEAK)    Surgeons: Chantal Mendoza MD Responsible Provider: Yuli Lawson MD    Anesthesia Type: general ASA Status: 2          Anesthesia Type: general    Marisol Phase I: Marisol Score: 9    Marisol Phase II:      Last vitals: Reviewed and per EMR flowsheets.        Anesthesia Post Evaluation    Patient location during evaluation: PACU  Patient participation: complete - patient participated  Level of consciousness: awake  Complications: no  Cardiovascular status: hemodynamically stable  Respiratory status: acceptable

## 2021-11-29 NOTE — PROGRESS NOTES
Pt arrived to PACU from OR in stable condition and is alert to verbal stimuli. Pt can move extremities to command; denies weakness, numbness, tingling. Respirations are even on 5L O2 per SM. Skin warm, dry, and with appropriate for ethnicity color. Abd is soft. Pain is tolerable at this time. Low back surgical site(s) intact with dressing= sutures (not dissolvable), covered with vaseline guaze, dry dressing       S/P: incision and drainage of pseudomeningocele, repair lumbar cerebrospinal fluid leak with Dr. Megan Paez at [facility].    Pt is laying flat, resting comfortably in bed.     Oanh Villalobos BSN, RN, CMSRN  PACU, Pre-op, SDS

## 2021-11-30 LAB
HCT VFR BLD CALC: 42.6 % (ref 36–48)
HEMOGLOBIN: 14.2 G/DL (ref 12–16)

## 2021-11-30 PROCEDURE — 6370000000 HC RX 637 (ALT 250 FOR IP): Performed by: INTERNAL MEDICINE

## 2021-11-30 PROCEDURE — 2500000003 HC RX 250 WO HCPCS: Performed by: NEUROLOGICAL SURGERY

## 2021-11-30 PROCEDURE — 6370000000 HC RX 637 (ALT 250 FOR IP): Performed by: NURSE PRACTITIONER

## 2021-11-30 PROCEDURE — 6360000002 HC RX W HCPCS: Performed by: NEUROLOGICAL SURGERY

## 2021-11-30 PROCEDURE — 85018 HEMOGLOBIN: CPT

## 2021-11-30 PROCEDURE — 85014 HEMATOCRIT: CPT

## 2021-11-30 PROCEDURE — 2580000003 HC RX 258: Performed by: NEUROLOGICAL SURGERY

## 2021-11-30 PROCEDURE — 94760 N-INVAS EAR/PLS OXIMETRY 1: CPT

## 2021-11-30 PROCEDURE — 1200000000 HC SEMI PRIVATE

## 2021-11-30 PROCEDURE — 36415 COLL VENOUS BLD VENIPUNCTURE: CPT

## 2021-11-30 PROCEDURE — 6370000000 HC RX 637 (ALT 250 FOR IP): Performed by: NEUROLOGICAL SURGERY

## 2021-11-30 RX ORDER — POLYETHYLENE GLYCOL 3350 17 G/17G
17 POWDER, FOR SOLUTION ORAL DAILY
Status: DISCONTINUED | OUTPATIENT
Start: 2021-11-30 | End: 2021-12-01 | Stop reason: HOSPADM

## 2021-11-30 RX ORDER — NICOTINE 21 MG/24HR
1 PATCH, TRANSDERMAL 24 HOURS TRANSDERMAL DAILY
Status: DISCONTINUED | OUTPATIENT
Start: 2021-11-30 | End: 2021-12-01 | Stop reason: HOSPADM

## 2021-11-30 RX ORDER — ACETAMINOPHEN 325 MG/1
650 TABLET ORAL EVERY 4 HOURS PRN
Status: DISCONTINUED | OUTPATIENT
Start: 2021-11-30 | End: 2021-12-01 | Stop reason: HOSPADM

## 2021-11-30 RX ORDER — DIAZEPAM 5 MG/1
5 TABLET ORAL NIGHTLY PRN
COMMUNITY

## 2021-11-30 RX ADMIN — ACETAMINOPHEN 650 MG: 325 TABLET ORAL at 23:36

## 2021-11-30 RX ADMIN — HYDROMORPHONE HYDROCHLORIDE 0.5 MG: 1 INJECTION, SOLUTION INTRAMUSCULAR; INTRAVENOUS; SUBCUTANEOUS at 07:16

## 2021-11-30 RX ADMIN — CEFAZOLIN 2000 MG: 10 INJECTION, POWDER, FOR SOLUTION INTRAVENOUS at 06:57

## 2021-11-30 RX ADMIN — OXYCODONE 10 MG: 5 TABLET ORAL at 18:52

## 2021-11-30 RX ADMIN — ACETAMINOPHEN 650 MG: 325 TABLET ORAL at 08:47

## 2021-11-30 RX ADMIN — SODIUM CHLORIDE, PRESERVATIVE FREE 10 ML: 5 INJECTION INTRAVENOUS at 08:47

## 2021-11-30 RX ADMIN — SODIUM CHLORIDE, PRESERVATIVE FREE 10 ML: 5 INJECTION INTRAVENOUS at 20:18

## 2021-11-30 RX ADMIN — ACETAMINOPHEN 650 MG: 325 TABLET ORAL at 15:07

## 2021-11-30 RX ADMIN — METHOCARBAMOL 500 MG: 500 TABLET ORAL at 20:18

## 2021-11-30 RX ADMIN — POTASSIUM CHLORIDE, DEXTROSE MONOHYDRATE AND SODIUM CHLORIDE 1000 ML: 150; 5; 450 INJECTION, SOLUTION INTRAVENOUS at 04:00

## 2021-11-30 RX ADMIN — METHOCARBAMOL 500 MG: 500 TABLET ORAL at 15:06

## 2021-11-30 RX ADMIN — METHOCARBAMOL 500 MG: 500 TABLET ORAL at 08:47

## 2021-11-30 RX ADMIN — POLYETHYLENE GLYCOL 3350 17 G: 17 POWDER, FOR SOLUTION ORAL at 09:24

## 2021-11-30 RX ADMIN — HYDROMORPHONE HYDROCHLORIDE 0.5 MG: 1 INJECTION, SOLUTION INTRAMUSCULAR; INTRAVENOUS; SUBCUTANEOUS at 04:07

## 2021-11-30 RX ADMIN — HYDROMORPHONE HYDROCHLORIDE 0.5 MG: 1 INJECTION, SOLUTION INTRAMUSCULAR; INTRAVENOUS; SUBCUTANEOUS at 00:34

## 2021-11-30 RX ADMIN — OXYCODONE 10 MG: 5 TABLET ORAL at 11:22

## 2021-11-30 ASSESSMENT — PAIN SCALES - GENERAL
PAINLEVEL_OUTOF10: 7
PAINLEVEL_OUTOF10: 7
PAINLEVEL_OUTOF10: 6
PAINLEVEL_OUTOF10: 3
PAINLEVEL_OUTOF10: 5
PAINLEVEL_OUTOF10: 7
PAINLEVEL_OUTOF10: 10
PAINLEVEL_OUTOF10: 0
PAINLEVEL_OUTOF10: 5

## 2021-11-30 ASSESSMENT — PAIN DESCRIPTION - LOCATION
LOCATION: BACK
LOCATION: BACK
LOCATION: HEAD
LOCATION: BACK
LOCATION: HEAD
LOCATION: BACK
LOCATION_2: BACK

## 2021-11-30 ASSESSMENT — PAIN DESCRIPTION - PROGRESSION
CLINICAL_PROGRESSION_2: GRADUALLY WORSENING
CLINICAL_PROGRESSION: GRADUALLY WORSENING
CLINICAL_PROGRESSION: GRADUALLY IMPROVING
CLINICAL_PROGRESSION: GRADUALLY WORSENING

## 2021-11-30 ASSESSMENT — PAIN DESCRIPTION - FREQUENCY
FREQUENCY: CONTINUOUS

## 2021-11-30 ASSESSMENT — PAIN DESCRIPTION - DESCRIPTORS
DESCRIPTORS: STABBING
DESCRIPTORS: BURNING
DESCRIPTORS: BURNING
DESCRIPTORS_2: STABBING
DESCRIPTORS: STABBING
DESCRIPTORS: HEADACHE
DESCRIPTORS: HEADACHE

## 2021-11-30 ASSESSMENT — PAIN DESCRIPTION - DURATION: DURATION_2: CONTINUOUS

## 2021-11-30 ASSESSMENT — PAIN DESCRIPTION - INTENSITY: RATING_2: 6

## 2021-11-30 ASSESSMENT — PAIN DESCRIPTION - PAIN TYPE
TYPE: SURGICAL PAIN
TYPE: ACUTE PAIN
TYPE: ACUTE PAIN
TYPE: SURGICAL PAIN
TYPE_2: SURGICAL

## 2021-11-30 ASSESSMENT — PAIN DESCRIPTION - ORIENTATION
ORIENTATION: LOWER
ORIENTATION_2: LOWER

## 2021-11-30 ASSESSMENT — PAIN DESCRIPTION - ONSET
ONSET: ON-GOING
ONSET_2: ON-GOING
ONSET: ON-GOING

## 2021-11-30 NOTE — CONSULTS
Consult -Internal Medicine  Dr. Thomas Knight  11/29/2021      PCP: Amanda Randolph MD, MD  Referring Physician: Ramila Funes MD    Code Status: Full Code  Current Diet: ADULT DIET; Regular      Cc: Le Vicente is a61 y.o. female who presents with Postprocedural pseudomeningocele. Problem list of hospitalization thus far: 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] 11/29/2021    CSF leak [G96.00] 11/29/2021    Headache [R51.9] 11/29/2021    Spinal stenosis at L4-L5 level [M48.061] 11/29/2021    Hypotension [I95.9] 11/29/2021     HPI: Le Vicente has been admitted by Ramila Funes MD with headache, lower ext radiculopathy. Pt presents with with above complaint. Pt has has moderate pain to the legs  Duration - going on for at least 1 month  - since having laminectomy done  It is described as a intermittent pain that is aching in quality. Severity rated as 7 out of 10 at its worst  Pain is so severe that it limits usual activities  No improvement with medications, therapy or injections  As it is not improved with conservative measures, surgery has been recommended    Pt has undergone Extension of previous decompressive laminectomy L45, Repair of durotomy or CSF leak and drainage of pseudomeningocele without any apparent complications. Pt is doing well post operatively. Pain is controlled at this time. I have been asked to see the patient for evaluation of her internal medicine issues:  has a past medical history of DONG (generalized anxiety disorder), PONV (postoperative nausea and vomiting), and Spinal stenosis. .  Home meds have been reveiwed and restartedas indicated. Pt denies having other complaints at this time.     Pt has been evaluated post operatively  Pain does appear to be controlled - on iv meds  Patient has not worked with therapy at this time      Electronic chart reviewed  Home meds reviewed and restarted as indicated  Op note, anesthesia flowsheet reviewed  Case d/w nursing    BP noted to be a little low in recovery  Has improved with fluids      Review of Systems: (1 system for EPF, 2-9 for detailed, 10+ for comprehensive)  Constitutional: Negative for chills and fever. HENT: Negative for ear pain and mouth sores. Eyes: Negative for pain, redness and visual disturbance. Respiratory: Negative for cough, shortness of breath and wheezing. Cardiovascular: Negative for chest pain and leg swelling. Gastrointestinal: Negative for diarrhea, nausea and vomiting. Endocrine: Negative for polydipsia and polyphagia. Genitourinary: Negative for frequency and urgency. Musculoskeletal: Negative for back pain and neck pain. Skin: Negative for color change. Allergic/Immunologic: Negative for food allergies. Neurological: Negative for seizures and syncope. +headache  Hematological: Does not bruise/bleed easily. Psychiatric/Behavioral: Negative for confusion. The patient is not nervous/anxious.              Past Medical History:   Past Medical History:   Diagnosis Date    DONG (generalized anxiety disorder)     PONV (postoperative nausea and vomiting)     Spinal stenosis        Past Surgical History:   Past Surgical History:   Procedure Laterality Date    APPENDECTOMY      BACK SURGERY      CHOLECYSTECTOMY      COLONOSCOPY      DILATION AND CURETTAGE OF UTERUS      HERNIA REPAIR      UMBILICAL    KNEE ARTHROSCOPY Right        Social History:   Social History     Tobacco History     Smoking Status  Current Every Day Smoker Smoking Frequency  1 pack/day for 30 years (30 pk yrs) Smoking Tobacco Type  Cigarettes    Smokeless Tobacco Use  Never Used          Alcohol History     Alcohol Use Status  Never          Drug Use     Drug Use Status  Never          Sexual Activity     Sexually Active  Not Asked                Fam History:   Family History   Problem Relation Age of Onset    Diabetes Mother     Heart Failure Mother        PFSH: The above PMHx, PSHx, SocHx, FamHx has been reviewed by myself. (1 area for detailed, 2-3 for comprehensive)      Code Status: Full Code    Meds  following list ofhome medications from electronic chart has been reviewed by myself  Prior to Admission medications    Medication Sig Start Date End Date Taking? Authorizing Provider   methocarbamol (ROBAXIN) 500 MG tablet Take 500 mg by mouth 3 times daily    Yes Historical Provider, MD   acetaminophen (TYLENOL) 500 MG tablet Take 500 mg by mouth every 6 hours as needed for Pain   Yes Historical Provider, MD         Allergies   Allergen Reactions    Adhesive Tape Hives     PAPER TAPE OK             EXAM: (2-7 system forEPF/Detailed, ?8 for Comprehensive)  BP (!) 105/54   Pulse 86   Temp 97 °F (36.1 °C) (Temporal)   Resp 12   Ht 5' 3\" (1.6 m)   Wt 152 lb (68.9 kg)   SpO2 92%   BMI 26.93 kg/m²   Constitutional: vitals as above: alert, appears stated age and cooperative    Psychiatric: normal insight and judgment, oriented to person, place, time, and general circumstances    Head: Normocephalic, without obvious abnormality, atraumatic    Eyes:lids and lashes normal, conjunctivae and sclerae normal and pupils equal, round, reactive to light and accomodation    EMNT: external ears normal, nares midline    Neck: no carotid bruit, supple, symmetrical, trachea midline and thyroid not enlarged, symmetric, no tenderness/mass/nodules     Respiratory: clear to auscultation and percussion bilaterally with normal respiratory effort    Cardiovascular: normal rate, regular rhythm, normal S1 and S2 and no murmurs    Gastrointestinal: soft, non-tender, non-distended, normal bowel sounds, no masses or organomegaly    Extremities: no clubbing, no edema    Skin:No rashes or nodules noted.     Neurologic:negative           LABS:  Labs Reviewed   BASIC METABOLIC PANEL - Abnormal; Notable for the following components:       Result Value    Glucose 105 (*) CREATININE 0.5 (*)     All other components within normal limits    Narrative:     Performed at:  [facility]  [address] [facility],  [place], [address]   Phone [phone]   TYPE AND SCREEN    Narrative:     Performed at:  [facility]  [address] [facility],  [place], [address]   Phone [phone]         IMAGING:  Imaging has been reviewed in the computerized chart  No results found. EKG: reviewed if available      Lab Results   Component Value Date    GLUCOSE 105 11/29/2021     No results found for: POCGLU  BP (!) 105/54   Pulse 86   Temp 97 °F (36.1 °C) (Temporal)   Resp 12   Ht 5' 3\" (1.6 m)   Wt 152 lb (68.9 kg)   SpO2 92%   BMI 26.93 kg/m²     MEDICAL DECISION MAKING:    Principal Problem:    Postprocedural pseudomeningocele -New Problem to me. Doing well post op  Plan: Continue on post-operative pathway. PT/OT to see patient. Continue pain control - on IV pain meds acutely post op. Work on transitioning to oral pain meds when possible. Will follow serial h/h to monitor for acute blood loss anemia - labs ordered for tomorrow. Active Problems:    CSF leak     Headache -Established problem. Improving. Plan: cont to monitor for sx    Spinal stenosis at L4-L5 level -Established problem. S/p laminectomy a month ago; extended today in OR  Plan: per neurosurg    Hypotension -New Problem to me. Low (in 80s) in recovery, better now - now 100-110  Plan: cont to monitor. Cont fluids as needed        Diagnoses as listed above, designated as new or established and plan outlined for each. Data Reviewed:   (1) Lab tests were reviewed or ordered. (1) Radiology tests were reviewed or ordered. (1) Medical test (Echo, EKG, PFT/erasto) were not ordered. (1) History was not obtained from someone other than patient  (1) Old records  were reviewed - see HPI/MDM for pertinent details if review done.   (2) Case was discussed with another health care provider: dr Smita Myers  (2) Imaging was viewed by myself. (2) EKG  was not viewed by myself. Thanks for the consult! Will follow along daily while patient is in house.       (Please note that portions of this note were completed with a voice recognition program.  Efforts were made to edit the dictations but occasionally words are mis-transcribed.)      Katherin Siegel MD  11/29/2021

## 2021-11-30 NOTE — PROGRESS NOTES
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

## 2021-11-30 NOTE — PROGRESS NOTES
Physical/Occupational Therapy  Nyasia Vincent     PT/OT evaluations on hold at this time following bedrest order per neurosurgery until 16:00. PT/OT will follow-up with this pt as the schedule allows. Thanks.   Leobardo Soulier, [place] DPT [id number]

## 2021-11-30 NOTE — PROGRESS NOTES
Progress Note - Dr. Celestine Babinski - Internal Medicine  PCP: Llana Cowden, MD, MD [address] [facility] / [place] [phone]    Hospital Day: 1  Code Status: Full Code  Current Diet: ADULT DIET; Regular        CC: follow up on medical issues    Subjective:   Sana Pastor is a 64 y.o. female. Pt seen and examined  Chart reviewed since last visit, labs and imaging below        Doing about the same  Still with headache  Wants nicotine patch    She denies chest pain, denies shortness of breath, denies nausea,  denies emesis. 10 system Review of Systems is reviewed with patient, and pertinent positives are noted in HPI above . Otherwise, Review of systems is negative. I have reviewed the patient's medical and social history in detail and updated the computerized patient record. To recap: She  has a past medical history of DONG (generalized anxiety disorder), PONV (postoperative nausea and vomiting), and Spinal stenosis. . She  has a past surgical history that includes Appendectomy; Knee arthroscopy (Right); hernia repair; Colonoscopy; Cholecystectomy; Dilation and curettage of uterus; back surgery; and Spine surgery (N/A, 11/29/2021). . She  reports that she has been smoking cigarettes. She has a 30.00 pack-year smoking history. She has never used smokeless tobacco. She reports that she does not drink alcohol and does not use drugs. .        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] 11/29/2021    CSF leak [G96.00] 11/29/2021    Headache [R51.9] 11/29/2021    Spinal stenosis at L4-L5 level [M48.061] 11/29/2021    Hypotension [I95.9] 11/29/2021       Current Facility-Administered Medications: acetaminophen (TYLENOL) tablet 650 mg, 650 mg, Oral, Q4H PRN  dextrose 5 % and 0.45 % NaCl with KCl 20 mEq infusion, 1,000 mL, IntraVENous, Continuous  sodium chloride flush 0.9 % injection 5-40 mL, 5-40 mL, IntraVENous, 2 times per day  sodium chloride flush 0.9 % injection 5-40 mL, 5-40 mL, IntraVENous, PRN  0.9 % sodium chloride infusion, 25 mL, IntraVENous, PRN  ondansetron (ZOFRAN-ODT) disintegrating tablet 4 mg, 4 mg, Oral, Q8H PRN **OR** ondansetron (ZOFRAN) injection 4 mg, 4 mg, IntraVENous, Q6H PRN  oxyCODONE (ROXICODONE) immediate release tablet 5 mg, 5 mg, Oral, Q4H PRN **OR** oxyCODONE (ROXICODONE) immediate release tablet 10 mg, 10 mg, Oral, Q4H PRN  HYDROmorphone HCl PF (DILAUDID) injection 0.5 mg, 0.5 mg, IntraVENous, Q3H PRN  polyethylene glycol (GLYCOLAX) packet 17 g, 17 g, Oral, Daily PRN  bisacodyl (DULCOLAX) EC tablet 5 mg, 5 mg, Oral, Daily PRN  magnesium hydroxide (MILK OF MAGNESIA) 400 MG/5ML suspension 30 mL, 30 mL, Oral, Daily PRN  methocarbamol (ROBAXIN) tablet 500 mg, 500 mg, Oral, TID         Objective:  /71   Pulse 67   Temp 97.5 °F (36.4 °C) (Oral)   Resp 16   Ht 5' 3\" (1.6 m)   Wt 153 lb 12.8 oz (69.8 kg)   SpO2 97%   BMI 27.24 kg/m²      Patient Vitals for the past 24 hrs:   BP Temp Temp src Pulse Resp SpO2 Height Weight   11/30/21 0701 118/71          11/30/21 0515       5' 3\" (1.6 m) 153 lb 12.8 oz (69.8 kg)   11/30/21 0400 119/75 97.5 °F (36.4 °C) Oral 67 16 97 %     11/30/21 0018 127/82 97.7 °F (36.5 °C) Oral 76 16 97 %     11/29/21 2320 113/71   75 14 98 %     11/29/21 2250 104/61   73 14 98 %     11/29/21 2220 125/78 97.7 °F (36.5 °C) Oral 70 12 95 %     11/29/21 2100 (!) 106/58   82 9 92 %     11/29/21 2000 (!) 105/54   86 12 92 %     11/29/21 1900 107/60   85 9 95 %     11/29/21 1800 (!) 104/52   94 12 95 %     11/29/21 1700 111/66   91 19 96 %     11/29/21 1645 110/60   96 18 97 %     11/29/21 1630 128/70   86 17 100 %     11/29/21 1625 134/65   96 17 100 %     11/29/21 1620 136/73   82 14 100 %     11/29/21 1615 139/68 97 °F (36.1 °C) Temporal 85 14 100 %     11/29/21 1610 (!) 147/75   87 17 100 %     11/29/21 1605 136/77   84 15 99 %     11/29/21 1602 (!) 143/64 97.2 °F (36.2 °C) Temporal 105 17 97 %     11/29/21 1157 118/72 97 °F (36.1 °C) Temporal 85 16 98 %  152 lb (68.9 kg)     Patient Vitals for the past 96 hrs (Last 3 readings):   Weight   11/30/21 0515 153 lb 12.8 oz (69.8 kg)   11/29/21 1157 152 lb (68.9 kg)           Intake/Output Summary (Last 24 hours) at 11/30/2021 0843  Last data filed at 11/29/2021 1559  Gross per 24 hour   Intake 700 ml   Output 25 ml   Net 675 ml         Physical Exam:   Vitals as above  General appearance: alert, appears stated age and cooperative    Head: Normocephalic, without obvious abnormality, atraumatic    Lungs: clear to auscultation bilaterally    Heart: regular rate and rhythm, S1, S2 normal, no murmur    Abdomen: soft, non-tender; bowel sounds normal; no masses, no organomegaly    Extremities: extremities normal, atraumatic, no cyanosis, no edema      Labs:  Lab Results   Component Value Date    WBC 11.1 (H) 11/23/2021    HGB 14.2 11/30/2021    HCT 42.6 11/30/2021     11/23/2021     11/29/2021    K 4.3 11/29/2021     11/29/2021    CREATININE 0.5 (L) 11/29/2021    BUN 17 11/29/2021    CO2 28 11/29/2021    INR 0.93 11/23/2021     No results found for: CKTOTAL, CKMB, CKMBINDEX, TROPONINI    Recent Imaging Results are Reviewed:  No results found. Lab Results   Component Value Date    GLUCOSE 105 11/29/2021     No results found for: POCGLU  /71   Pulse 67   Temp 97.5 °F (36.4 °C) (Oral)   Resp 16   Ht 5' 3\" (1.6 m)   Wt 153 lb 12.8 oz (69.8 kg)   SpO2 97%   BMI 27.24 kg/m²     Assessment and Plan:  Principal Problem:    Postprocedural pseudomeningocele -Established problem. Stable. Plan: continue bed rest for 24hrs post procedure. Case d/w david, neurosurg NP  Active Problems:    CSF leak    Headache -Established problem. Stable. persistent  Plan: tylenol added at her request.      Spinal stenosis at L4-L5 level     Hypotension -Established problem. Improving.     Plan: cont to monitor BP    Tobacco abuse - Established problem. Stable.     Plan - to order nicotine patch 14mg/d      (Please note that portions of this note were completed with a voice recognition program.  Efforts were made to edit the dictations but occasionally words are mis-transcribed.)        Zachariah Diallo MD  11/30/2021

## 2021-11-30 NOTE — PROGRESS NOTES
Post-op Progress Note - Neurosurgery    Subjective:  Meagan Moyer is a 64 y.o. female. Pain is controlled. Patient complains of incisional pain, denies leg/arm pain, denies hoarseness of voice, difficulty swallowing, N/V.  C/O HA even when lying flat    Objective:  Blood pressure 101/64, pulse 74, temperature 97.5 °F (36.4 °C), temperature source Oral, resp. rate 16, height 5' 3\" (1.6 m), weight 153 lb 12.8 oz (69.8 kg), SpO2 95 %. In: 400 [I.V.:400]  Out: 25     Physical Exam:  Incision:healing well  Motor:Motor exam is symmetrical 5 out of 5 all extremities bilaterally  Activity: BR    Labs:  BMP:   Lab Results   Component Value Date    GLUCOSE 105 11/29/2021    CO2 28 11/29/2021    BUN 17 11/29/2021    CREATININE 0.5 11/29/2021    CALCIUM 10.0 11/29/2021     WBC/Hgb/Hct/Plts:  --/14.2/42.6/-- (11/30 2099)     Imaging:  Xrays: none    Assessment and Plan:  Principal Problem:    Postprocedural pseudomeningocele  Active Problems:    CSF leak    Headache  Plan:tylenol prn, caffeine as needed and add nicotine patch    Spinal stenosis at L4-L5 level    Hypotension    POD # 1 S/P lumbar laminectomy/ and repair CSF leak   BR until 3 pm, then up to chair today. Tomorrow may ambulate in [address] [place] [address]. Ok to go to [facility] after 1600 today  Disposition: Home tomorrow  Internal Medicine consult per Dr. Hudson French for medical management.     Gloria Desirr, APRN - CNP  11/30/2021

## 2021-11-30 NOTE — PROGRESS NOTES
Assessment complete. Alert, oriented x4. Patient appears to be resting comfortably in bed. Dressing to back clean, dry, intact. Oriented to room and use of call light. Educated on fall risk and precautions, verbalized understanding. The care plan and education has been reviewed and mutually agreed upon with the patient. In bed, alarm on, bed in lowest position, call light and bedside table within reach. No further needs expressed at this time. 0034  Complaining of pain; patient has not eaten and does not feel like eating at this time; given PRN Dilaudid to prevent adverse effects related to administration of oxycodone. Per patient, using the bedpan in PACU was uncomfortable when she had to void; Purewick offered. Patient does not want Purewick to remain at all times. Patient agrees to call as needed for placement of Purewick. 0407  Complaining of pain, given PRN Dilaudid. 0413  Complaining of pain, given PRN Dilaudid. Patient requesting to help relieve headache; day shift RN Nelly Biggs made aware.

## 2021-11-30 NOTE — PROGRESS NOTES
Morning assessment completed, Dressing site looks clean dry intact, bp on lower side, pt to rest flat post op surgery, ordered tylenol for headache. The care plan and education has been reviewed and mutually agreed upon with the patient. 1150: oxycodone for pain given, see mar. 1700: pt ambulated to the bathroom, complain of feeling dizzy and went back to the bed. Will continue to encourage her to sit in the chair. 1853: oxy for pain 8/10 given, pt is in chair, feeling fine, no dizziness. 1913: pt requested to get back to bed, she started feeling, dizzy and seeing black dots. Pt is back to bed.

## 2021-11-30 NOTE — PROGRESS NOTES
Incentive Spirometry education and demonstration completed by Respiratory Therapy Yes      Response to education: Very Good     Teaching Time: 5 minutes    Minimum Predicted Vital Capacity - 524   mL. Patient's Actual Vital Capacity - 500 mL.  Turning over to Nursing for routine follow-up yes    Comments: continue spirometer q2h w/a    Electronically signed by Ade Adhikari RCP on 11/30/2021 at 5:26 PM

## 2021-12-01 VITALS
WEIGHT: 153.8 LBS | OXYGEN SATURATION: 93 % | TEMPERATURE: 98 F | HEART RATE: 110 BPM | RESPIRATION RATE: 18 BRPM | BODY MASS INDEX: 27.25 KG/M2 | DIASTOLIC BLOOD PRESSURE: 65 MMHG | SYSTOLIC BLOOD PRESSURE: 101 MMHG | HEIGHT: 63 IN

## 2021-12-01 LAB
ANION GAP SERPL CALCULATED.3IONS-SCNC: 8 MMOL/L (ref 3–16)
BASOPHILS ABSOLUTE: 0 K/UL (ref 0–0.2)
BASOPHILS RELATIVE PERCENT: 0.4 %
BUN BLDV-MCNC: 14 MG/DL (ref 7–20)
CALCIUM SERPL-MCNC: 9.8 MG/DL (ref 8.3–10.6)
CHLORIDE BLD-SCNC: 103 MMOL/L (ref 99–110)
CO2: 29 MMOL/L (ref 21–32)
CREAT SERPL-MCNC: <0.5 MG/DL (ref 0.6–1.1)
EOSINOPHILS ABSOLUTE: 0.4 K/UL (ref 0–0.6)
EOSINOPHILS RELATIVE PERCENT: 3.3 %
GFR AFRICAN AMERICAN: >60
GFR NON-AFRICAN AMERICAN: >60
GLUCOSE BLD-MCNC: 110 MG/DL (ref 70–99)
HCT VFR BLD CALC: 43.2 % (ref 36–48)
HEMOGLOBIN: 14.4 G/DL (ref 12–16)
LYMPHOCYTES ABSOLUTE: 2.9 K/UL (ref 1–5.1)
LYMPHOCYTES RELATIVE PERCENT: 26 %
MCH RBC QN AUTO: 28.2 PG (ref 26–34)
MCHC RBC AUTO-ENTMCNC: 33.3 G/DL (ref 31–36)
MCV RBC AUTO: 84.7 FL (ref 80–100)
MONOCYTES ABSOLUTE: 0.8 K/UL (ref 0–1.3)
MONOCYTES RELATIVE PERCENT: 7.6 %
NEUTROPHILS ABSOLUTE: 7 K/UL (ref 1.7–7.7)
NEUTROPHILS RELATIVE PERCENT: 62.7 %
PDW BLD-RTO: 13.9 % (ref 12.4–15.4)
PLATELET # BLD: 278 K/UL (ref 135–450)
PMV BLD AUTO: 8.1 FL (ref 5–10.5)
POTASSIUM SERPL-SCNC: 4.2 MMOL/L (ref 3.5–5.1)
RBC # BLD: 5.1 M/UL (ref 4–5.2)
SODIUM BLD-SCNC: 140 MMOL/L (ref 136–145)
WBC # BLD: 11.1 K/UL (ref 4–11)

## 2021-12-01 PROCEDURE — 80048 BASIC METABOLIC PNL TOTAL CA: CPT

## 2021-12-01 PROCEDURE — 97165 OT EVAL LOW COMPLEX 30 MIN: CPT

## 2021-12-01 PROCEDURE — 85025 COMPLETE CBC W/AUTO DIFF WBC: CPT

## 2021-12-01 PROCEDURE — 97530 THERAPEUTIC ACTIVITIES: CPT

## 2021-12-01 PROCEDURE — 97535 SELF CARE MNGMENT TRAINING: CPT

## 2021-12-01 PROCEDURE — 6370000000 HC RX 637 (ALT 250 FOR IP): Performed by: NURSE PRACTITIONER

## 2021-12-01 PROCEDURE — 97116 GAIT TRAINING THERAPY: CPT

## 2021-12-01 PROCEDURE — 2580000003 HC RX 258: Performed by: INTERNAL MEDICINE

## 2021-12-01 PROCEDURE — 6370000000 HC RX 637 (ALT 250 FOR IP): Performed by: INTERNAL MEDICINE

## 2021-12-01 PROCEDURE — 6370000000 HC RX 637 (ALT 250 FOR IP): Performed by: NEUROLOGICAL SURGERY

## 2021-12-01 PROCEDURE — 36415 COLL VENOUS BLD VENIPUNCTURE: CPT

## 2021-12-01 PROCEDURE — 97161 PT EVAL LOW COMPLEX 20 MIN: CPT

## 2021-12-01 PROCEDURE — 2580000003 HC RX 258: Performed by: NEUROLOGICAL SURGERY

## 2021-12-01 RX ORDER — POLYETHYLENE GLYCOL 3350 17 G/17G
17 POWDER, FOR SOLUTION ORAL DAILY
Qty: 527 G | Refills: 0 | COMMUNITY
Start: 2021-12-02 | End: 2022-01-01

## 2021-12-01 RX ORDER — 0.9 % SODIUM CHLORIDE 0.9 %
500 INTRAVENOUS SOLUTION INTRAVENOUS ONCE
Status: COMPLETED | OUTPATIENT
Start: 2021-12-01 | End: 2021-12-01

## 2021-12-01 RX ORDER — CYCLOBENZAPRINE HCL 10 MG
10 TABLET ORAL 3 TIMES DAILY PRN
Qty: 21 TABLET | Refills: 0 | Status: SHIPPED | OUTPATIENT
Start: 2021-12-01 | End: 2021-12-11

## 2021-12-01 RX ORDER — OXYCODONE HYDROCHLORIDE 5 MG/1
5 TABLET ORAL EVERY 6 HOURS PRN
Qty: 28 TABLET | Refills: 0 | Status: SHIPPED | OUTPATIENT
Start: 2021-12-01 | End: 2021-12-08

## 2021-12-01 RX ADMIN — SODIUM CHLORIDE, PRESERVATIVE FREE 10 ML: 5 INJECTION INTRAVENOUS at 09:55

## 2021-12-01 RX ADMIN — BISACODYL 5 MG: 5 TABLET, COATED ORAL at 14:59

## 2021-12-01 RX ADMIN — METHOCARBAMOL 500 MG: 500 TABLET ORAL at 08:39

## 2021-12-01 RX ADMIN — OXYCODONE 5 MG: 5 TABLET ORAL at 14:57

## 2021-12-01 RX ADMIN — POLYETHYLENE GLYCOL 3350 17 G: 17 POWDER, FOR SOLUTION ORAL at 08:34

## 2021-12-01 RX ADMIN — SODIUM CHLORIDE 500 ML: 9 INJECTION, SOLUTION INTRAVENOUS at 09:49

## 2021-12-01 ASSESSMENT — PAIN SCALES - GENERAL
PAINLEVEL_OUTOF10: 2
PAINLEVEL_OUTOF10: 5
PAINLEVEL_OUTOF10: 0
PAINLEVEL_OUTOF10: 2
PAINLEVEL_OUTOF10: 0
PAINLEVEL_OUTOF10: 5
PAINLEVEL_OUTOF10: 0
PAINLEVEL_OUTOF10: 2
PAINLEVEL_OUTOF10: 4

## 2021-12-01 ASSESSMENT — PAIN DESCRIPTION - PROGRESSION
CLINICAL_PROGRESSION: NOT CHANGED
CLINICAL_PROGRESSION: GRADUALLY WORSENING
CLINICAL_PROGRESSION: NOT CHANGED
CLINICAL_PROGRESSION: NOT CHANGED

## 2021-12-01 ASSESSMENT — PAIN DESCRIPTION - ONSET
ONSET: ON-GOING
ONSET: ON-GOING

## 2021-12-01 ASSESSMENT — PAIN DESCRIPTION - LOCATION
LOCATION: BACK

## 2021-12-01 ASSESSMENT — PAIN - FUNCTIONAL ASSESSMENT
PAIN_FUNCTIONAL_ASSESSMENT: PREVENTS OR INTERFERES SOME ACTIVE ACTIVITIES AND ADLS
PAIN_FUNCTIONAL_ASSESSMENT: PREVENTS OR INTERFERES SOME ACTIVE ACTIVITIES AND ADLS

## 2021-12-01 ASSESSMENT — PAIN DESCRIPTION - PAIN TYPE
TYPE: SURGICAL PAIN
TYPE: SURGICAL PAIN

## 2021-12-01 ASSESSMENT — PAIN DESCRIPTION - FREQUENCY
FREQUENCY: CONTINUOUS
FREQUENCY: CONTINUOUS

## 2021-12-01 ASSESSMENT — PAIN DESCRIPTION - ORIENTATION
ORIENTATION: LOWER

## 2021-12-01 ASSESSMENT — PAIN DESCRIPTION - DESCRIPTORS
DESCRIPTORS: BURNING
DESCRIPTORS: BURNING
DESCRIPTORS: SHARP;BURNING

## 2021-12-01 NOTE — PLAN OF CARE
Problem: Pain:  Goal: Pain level will decrease  Description: Pain level will decrease  12/1/2021 0154 by Dong Hart RN  Outcome: Ongoing  11/30/2021 2040 by Dong Hart RN  Outcome: Ongoing  Goal: Control of acute pain  Description: Control of acute pain  12/1/2021 0154 by Dong Hart RN  Outcome: Ongoing  11/30/2021 2040 by Dong Hart RN  Outcome: Ongoing     Problem: Falls - Risk of:  Goal: Will remain free from falls  Description: Will remain free from falls  12/1/2021 0154 by Dong Hart RN  Outcome: Ongoing  11/30/2021 2040 by Dong Hart RN  Outcome: Ongoing  Goal: Absence of physical injury  Description: Absence of physical injury  12/1/2021 0154 by Dong Hart RN  Outcome: Ongoing  11/30/2021 2040 by Dong Hart RN  Outcome: Ongoing

## 2021-12-01 NOTE — PLAN OF CARE
Problem: Pain:  Goal: Pain level will decrease  Description: Pain level will decrease  11/30/2021 2040 by Ericka Maria RN  Outcome: Ongoing  11/30/2021 0929 by Ericka Maria RN  Outcome: Ongoing  Goal: Control of acute pain  Description: Control of acute pain  11/30/2021 2040 by Ericka Maria RN  Outcome: Ongoing  11/30/2021 0929 by Ericka Maria RN  Outcome: Ongoing     Problem: Falls - Risk of:  Goal: Will remain free from falls  Description: Will remain free from falls  11/30/2021 2040 by Ericka Maria RN  Outcome: Ongoing  11/30/2021 0929 by Ericka Maria RN  Outcome: Ongoing  Goal: Absence of physical injury  Description: Absence of physical injury  11/30/2021 2040 by Ericka Maria RN  Outcome: Ongoing  11/30/2021 0929 by Ericka Maria RN  Outcome: Ongoing

## 2021-12-01 NOTE — CARE COORDINATION
SW reviewed pt's chart and consult. PT/OT had no home or outpatient recommendations at discharge. Only recommendation was a RTS which insurance does not cover and patient can  privately. No other needs identified. All needs met per case mgmt.     Electronically signed by MADISON Montesinos, KALEY on 12/1/2021 at 3:18 PM

## 2021-12-01 NOTE — PROGRESS NOTES
Occupational Therapy   Occupational Therapy Initial Assessment  Date: 2021   Patient Name: Le Vicente  MRN: [id number]     : 1965    Date of Service: 2021    Discharge Recommendations: Le Vicente scored a 18/24 on the AM-[facility] ADL Inpatient form. At this time, no further OT is recommended upon discharge due to anticipation of pt to return to near baseline with continued acute OT, with assist of  at home as needed. Recommend patient returns to prior setting with prior services. OT Equipment Recommendations  Equipment Needed: Yes  Mobility Devices: ADL Assistive Devices  ADL Assistive Devices: Toileting - Raised Toilet Seat with arms  Other: Pt may benefit from toilet riser or BSC placed atop toilet for elevated height--continue to assess    Assessment   Performance deficits / Impairments: Decreased functional mobility ; Decreased endurance; Decreased ADL status; Decreased balance; Decreased high-level IADLs  Assessment: Patient presents below baseline level of function d/t above deficits--was progressing with activity at home after back sx until recent setback requiring I&D with repair of pseudomeningocele. 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 has a past medical history of DONG (generalized anxiety disorder), PONV (postoperative nausea and vomiting), and Spinal stenosis. has a past surgical history that includes Appendectomy; Knee arthroscopy (Right); hernia repair; Colonoscopy; Cholecystectomy; Dilation and curettage of uterus; back surgery; and Spine surgery (N/A, 11/29/2021). 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  Recently given medication for pain  Patient Currently in Pain: Yes  Pain Assessment  Pain Assessment: 0-10  Pain Level: 2  Pain Location: Back  Pain Orientation: Lower  Pain Descriptors: Burning  Pre Treatment Pain Screening  Intervention List: Patient able to continue with treatment; Nurse/Physician notified  Vital Signs  BP: 105/71  BP Location: Left upper arm  Patient Currently in Pain: Yes     Social/Functional History  Social/Functional History  Lives With: Spouse (dogs)  Type of Home: House  Home Layout: Multi-level, Able to Live on Main level with bedroom/bathroom (upstairs with bed/bath on main floor, then basement--stair lift to all floors)  Home Access: Stairs to enter with rails  Entrance Stairs - Number of Steps: 3  Entrance Stairs - Rails: Both  Bathroom Shower/Tub: Walk-in shower  Bathroom Toilet: Standard  Bathroom Equipment: Grab bars in shower, Built-in shower seat, Hand-held shower  Bathroom Accessibility: Walker accessible  Home Equipment: 4 wheeled walker, Cane  ADL Assistance: Independent (IND prior to initial spine sx, has been assisted for LB ADL from  during recovery)  Homemaking Assistance: Independent  Ambulation Assistance: Independent (IND prior to initial spine sx, has been using RW or HHA of  during recovery)  Transfer Assistance: Independent  Active : Yes  Occupation: Full time employment  Type of occupation: [employer] at [employer]: Spending time with [employer]  Additional Comments: no falls       Objective   Hearing: Within functional limits    Orientation  Overall Orientation Status: Within Functional Limits     Balance  Sitting Balance: Supervision  Standing Balance: Stand by assistance  Standing Balance  Time: 2-3 min  Activity: stance at sink for oral hygiene  Functional Mobility  Functional - Mobility Device: Rolling Walker  Activity: Other;  To/from bathroom  Assist Level: Stand by assistance  Functional Mobility Comments: initially min A with HHA to/from bathroom, provided with RW and ambulates 100' in hallway SBA  ADL  Grooming: Supervision (oral hygiene in stance at sink, washing face)  UE Dressing:  (gown change)  Additional Comments: declines additional ADL  Tone RUE  RUE Tone: Normotonic  Tone LUE  LUE Tone: Normotonic  Coordination  Movements Are Fluid And Coordinated: Yes     Bed mobility  Supine to Sit: Stand by assistance  Scooting: Stand by assistance  Transfers  Sit to stand: Stand by assistance  Stand to sit: Stand by assistance  Transfer Comments: with increased time, cues for hand placement     Cognition  Overall Cognitive Status: WFL  Perception  Overall Perceptual Status: WFL     Sensation  Overall Sensation Status: WFL        LUE AROM (degrees)  LUE AROM : WFL  RUE AROM (degrees)  RUE AROM : WFL                      Plan   Plan  Times per week: 5-7  Times per day: Daily  Current Treatment Recommendations: Strengthening, Patient/Caregiver Education & Training, Equipment Evaluation, Education, & procurement, Balance Training, Functional Mobility Training, Endurance Training, Safety Education & Training, Self-Care / ADL    G-Code     OutComes Score                                                  AM-PAC Score        AM-PAC Inpatient Daily Activity Raw Score: 18 (12/01/21 1031)  AM-PAC Inpatient ADL T-Scale Score : 38.66 (12/01/21 1031)  ADL Inpatient CMS 0-100% Score: 46.65 (12/01/21 1031)  ADL Inpatient CMS G-Code Modifier : CK (12/01/21 1031)    Goals  Short term goals  Time Frame for Short term goals: discharge  Short term goal 1: UB ADL mod I  Short term goal 2: LB ADL mod I  Short term goal 3: Fxl transfers mod I  Short term goal 4: Fxl mob mod I  Short term goal 5:  Toileting mod I  Long term goals  Time Frame for Long term goals : LTG=STG       Therapy Time   Individual Concurrent Group Co-treatment   Time In 0833         Time Out 0918         Minutes 45            Timed Code Treatment Minutes:  30 Minutes    Total Treatment Minutes:  45 minutes    Bossman Catalan, [facility] O[facility]L [id number]    Bossman Catalan, OT

## 2021-12-01 NOTE — PROGRESS NOTES
Assessment complete. Alert, oriented x4. Dressing to back clean, dry, intact. Assisted to bathroom, contact guard assist with gait belt. Tolerated fairly well. Voided. Patient sat up in chair for 5 to 10 minutes. Upon return to bed, patient complaining of dizziness, lightheadedness. Prior to ambulation, /69; after ambulation, BP 99/65. Educated on fall risk and precautions, verbalized understanding. The care plan and education has been reviewed and mutually agreed upon with the patient. In bed, alarm on, bed in lowest position, call light and table within reach. No further needs expressed at this time. 2337  BP 92/56 after ambulating to bathroom. Patient complaining of pain, given PRN Tylenol. Plan to reassess BP for safe administration of oxycodone. 0006  Upon return to room, patient sleeping with respirations greater than 10 per minute. 0449  /73 laying to sitting. BP 97/65 after ambulation. 5611  Assisted to bathroom, voided. Sitting up in chair. Chair alarm on, call light and table within reach.

## 2021-12-01 NOTE — PROGRESS NOTES
Progress Note - Dr. Becky Coppola - Internal Medicine  PCP: Marc Beckford MD, MD [address] [facility] / Ritika Sandstone [phone]    Hospital Day: 2  Code Status: Full Code  Current Diet: ADULT DIET; Regular        CC: follow up on medical issues    Subjective:   Soy Butcher is a 64 y.o. female. Pt seen and examined  Chart reviewed since last visit, labs and imaging below        Doing about the same  Still with headache but much better than yest  Able to sit up, ambulate around room  Working with therapy currently    BP is a little soft today  ivf bolus ordered    She denies chest pain, denies shortness of breath, denies nausea,  denies emesis. 10 system Review of Systems is reviewed with patient, and pertinent positives are noted in HPI above . Otherwise, Review of systems is negative. I have reviewed the patient's medical and social history in detail and updated the computerized patient record. To recap: She  has a past medical history of DONG (generalized anxiety disorder), PONV (postoperative nausea and vomiting), and Spinal stenosis. . She  has a past surgical history that includes Appendectomy; Knee arthroscopy (Right); hernia repair; Colonoscopy; Cholecystectomy; Dilation and curettage of uterus; back surgery; and Spine surgery (N/A, 11/29/2021). . She  reports that she has been smoking cigarettes. She has a 30.00 pack-year smoking history. She has never used smokeless tobacco. She reports that she does not drink alcohol and does not use drugs. .        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] 11/29/2021    CSF leak [G96.00] 11/29/2021    Headache [R51.9] 11/29/2021    Spinal stenosis at L4-L5 level [M48.061] 11/29/2021    Hypotension [I95.9] 11/29/2021       Current Facility-Administered Medications: 0.9 % sodium chloride bolus, 500 mL, IntraVENous, Once  acetaminophen (TYLENOL) tablet 650 mg, 650 mg, Oral, Q4H PRN  nicotine (NICODERM CQ) 14 MG/24HR 1 patch, 1 patch, TransDERmal, Daily  polyethylene glycol (GLYCOLAX) packet 17 g, 17 g, Oral, Daily  sodium chloride flush 0.9 % injection 5-40 mL, 5-40 mL, IntraVENous, 2 times per day  sodium chloride flush 0.9 % injection 5-40 mL, 5-40 mL, IntraVENous, PRN  0.9 % sodium chloride infusion, 25 mL, IntraVENous, PRN  ondansetron (ZOFRAN-ODT) disintegrating tablet 4 mg, 4 mg, Oral, Q8H PRN **OR** ondansetron (ZOFRAN) injection 4 mg, 4 mg, IntraVENous, Q6H PRN  oxyCODONE (ROXICODONE) immediate release tablet 5 mg, 5 mg, Oral, Q4H PRN **OR** oxyCODONE (ROXICODONE) immediate release tablet 10 mg, 10 mg, Oral, Q4H PRN  HYDROmorphone HCl PF (DILAUDID) injection 0.5 mg, 0.5 mg, IntraVENous, Q3H PRN  polyethylene glycol (GLYCOLAX) packet 17 g, 17 g, Oral, Daily PRN  bisacodyl (DULCOLAX) EC tablet 5 mg, 5 mg, Oral, Daily PRN  magnesium hydroxide (MILK OF MAGNESIA) 400 MG/5ML suspension 30 mL, 30 mL, Oral, Daily PRN  methocarbamol (ROBAXIN) tablet 500 mg, 500 mg, Oral, TID         Objective:  BP (!) 91/59   Pulse 93   Temp 98.4 °F (36.9 °C) (Oral)   Resp 15   Ht 5' 3\" (1.6 m)   Wt 153 lb 12.8 oz (69.8 kg)   SpO2 95%   BMI 27.24 kg/m²      Patient Vitals for the past 24 hrs:   BP Temp Temp src Pulse Resp SpO2   12/01/21 0826 (!) 91/59 98.4 °F (36.9 °C) Oral 93 15 95 %   12/01/21 0330 97/65 98.1 °F (36.7 °C) Oral 86 14 95 %   12/01/21 0325 107/73        11/30/21 2332 (!) 92/56 97.6 °F (36.4 °C) Oral 78 15 94 %   11/30/21 2045 99/65        11/30/21 2016 111/69 98.3 °F (36.8 °C) Oral 80 16 97 %   11/30/21 1718      95 %   11/30/21 1410 102/70 98.2 °F (36.8 °C) Oral 74 16 95 %     Patient Vitals for the past 96 hrs (Last 3 readings):   Weight   11/30/21 0515 153 lb 12.8 oz (69.8 kg)   11/29/21 1157 152 lb (68.9 kg)           Intake/Output Summary (Last 24 hours) at 12/1/2021 0847  Last data filed at 12/1/2021 0819  Gross per 24 hour   Intake 485 ml   Output 2225 ml   Net -1740 ml         Physical Exam: Vitals as above  General appearance: alert, appears stated age and cooperative    Head: Normocephalic, without obvious abnormality, atraumatic    Lungs: clear to auscultation bilaterally    Heart: regular rate and rhythm, S1, S2 normal, no murmur    Abdomen: soft, non-tender; bowel sounds normal; no masses, no organomegaly    Extremities: extremities normal, atraumatic, no cyanosis, no edema      Labs:  Lab Results   Component Value Date    WBC 11.1 (H) 12/01/2021    HGB 14.4 12/01/2021    HCT 43.2 12/01/2021     12/01/2021     12/01/2021    K 4.2 12/01/2021     12/01/2021    CREATININE <0.5 (L) 12/01/2021    BUN 14 12/01/2021    CO2 29 12/01/2021    INR 0.93 11/23/2021     No results found for: CKTOTAL, CKMB, CKMBINDEX, TROPONINI    Recent Imaging Results are Reviewed:  No results found. Lab Results   Component Value Date    GLUCOSE 110 12/01/2021     No results found for: POCGLU  BP (!) 91/59   Pulse 93   Temp 98.4 °F (36.9 °C) (Oral)   Resp 15   Ht 5' 3\" (1.6 m)   Wt 153 lb 12.8 oz (69.8 kg)   SpO2 95%   BMI 27.24 kg/m²     Assessment and Plan:  Principal Problem:    Postprocedural pseudomeningocele -Established problem. Stable. Plan: finished bed rest for 24hrs post procedure. Case d/w david neurosurg NP - hopefully home today  Active Problems:    CSF leak    Headache -Established problem. Stable. persistent  Plan: tylenol added at her request.      Spinal stenosis at L4-L5 level     Hypotension -Established problem. In 90s again  Plan: cont to monitor BP, ivf bolus ordered    Tobacco abuse - Established problem. Stable.     Plan - order nicotine patch 14mg/d    Disp - if BP comes up, ok with home d/c later today    (Please note that portions of this note were completed with a voice recognition program.  Efforts were made to edit the dictations but occasionally words are mis-transcribed.)        Kaci Kruse MD  12/1/2021

## 2021-12-01 NOTE — DISCHARGE SUMMARY
Discharge Summary    Date of Admission: 11/29/2021 10:34 AM  Date of Discharge: 12/1/2021  Admission Diagnosis:   Patient Active Problem List   Diagnosis    Postprocedural pseudomeningocele    CSF leak    Headache    Spinal stenosis at L4-L5 level    Hypotension     Discharge Diagnosis: Same   Condition on Discharge: good  Attending for Admission: Dr. Evita Das  Procedures: 1. Extension of previous decompressive laminectomy L45  2. Repair of durotomy or CSF leak  3. Drainage of Pseudomeningocoele. 4.  Microdissection w/ operating Microscope    Hospital Course: Allison Bellamy is a 64 y.o. female patient with lower extremity radicular pain, headache and pseudo meningocoele after laminectomy 1 month ago. She underwent the procedure listed above on date of admission. After surgery, Her pre-operative radicular pain was improved. She complained of incisional pain. The pain was well-controlled on oral medications. The dressing was clean, dry and intact. There was no erythema or edema around the surgical site. HA resolved. Some lightheadedness treated with fluid bolus and robaxin d/cd. Prior to discharge She was eating well, urinating and ambulating with a steady gait with PT/OT.      Discharge Vitals/Labs: BP 96/61   Pulse 91   Temp 98.5 °F (36.9 °C) (Oral)   Resp 16   Ht 5' 3\" (1.6 m)   Wt 153 lb 12.8 oz (69.8 kg)   SpO2 96%   BMI 27.24 kg/m²   CBC:   Lab Results   Component Value Date    WBC 11.1 12/01/2021    RBC 5.10 12/01/2021    HGB 14.4 12/01/2021    HCT 43.2 12/01/2021    MCV 84.7 12/01/2021    MCH 28.2 12/01/2021    MCHC 33.3 12/01/2021    RDW 13.9 12/01/2021     12/01/2021    MPV 8.1 12/01/2021     CMP:    Lab Results   Component Value Date     12/01/2021    K 4.2 12/01/2021     12/01/2021    CO2 29 12/01/2021    BUN 14 12/01/2021    CREATININE <0.5 12/01/2021    GFRAA >60 12/01/2021    LABGLOM >60 12/01/2021    GLUCOSE 110 12/01/2021    CALCIUM 9.8 12/01/2021      Discharge Medications:      Medication List      START taking these medications    cyclobenzaprine 10 MG tablet  Commonly known as: FLEXERIL  Take 1 tablet by mouth 3 times daily as needed for Muscle spasms     oxyCODONE 5 MG immediate release tablet  Commonly known as: ROXICODONE  Take 1 tablet by mouth every 6 hours as needed for Pain for up to 7 days. polyethylene glycol 17 g packet  Commonly known as: GLYCOLAX  Take 17 g by mouth daily  Start taking on: December 2, 2021        CONTINUE taking these medications    acetaminophen 500 MG tablet  Commonly known as: TYLENOL     diazePAM 5 MG tablet  Commonly known as: VALIUM        STOP taking these medications    methocarbamol 500 MG tablet  Commonly known as: ROBAXIN           Where to Get Your Medications      These medications were sent to [facility] [address] [facility], [address] [facility] [address] [place] [address], Ivonne Moscoso [address]    Phone: [phone]   · cyclobenzaprine 10 MG tablet  · oxyCODONE 5 MG immediate release tablet     You can get these medications from any pharmacy    You don't need a prescription for these medications  · polyethylene glycol 17 g packet       Discharge Destination: The patient was discharged to Home. Follow-up: The patient is to follow-up with our office in the office in 2-3 weeks. Discharge Instructions: Verbal and written discharge instructions as well as dressing change instructions were given to the patient at the time of consent. No anticoagulation for 1 week post-operatively. No driving. No lifting or bending. Pt to remain sedentary until f/u appt.     Santana Bergeron, RADHA - CHELA  12/1/2021

## 2021-12-01 NOTE — PROGRESS NOTES
Shift assessment complete. BP low. Pt states of pain a 5 on a 10 point scale. Pt complains of dizziness and lightheadedness when standing. Started NS bolus per doc orders.      Dada Leavitt RN

## 2021-12-01 NOTE — PROGRESS NOTES
Physical Therapy    Facility/Department: [address] ORTHO/NEURO NURSING  Initial Assessment    NAME: Reji Tsai  : 1965  MRN: [id number]    Date of Service: 2021    Discharge Recommendations: Reji Tsai scored a 18/24 on the AM-PAC short mobility form. At this time, no further PT is recommended upon discharge due to pt's ability to return home functionally safe. Recommend patient returns to prior setting with prior services. PT Equipment Recommendations  Equipment Needed: No    Assessment   Body structures, Functions, Activity limitations: Decreased functional mobility ; Decreased endurance; Decreased strength; Decreased balance; Increased pain  Assessment: Pt is just coming off of bed rest and reports feeling weak. Pt was having orthostatic type symptoms but they subsided quickly and pt was able to tolerate PT session. Pt is able to ambulate and perform functional tasks but requires extended time and due to the low BP and pain it makes mobility difficult. Pt would therfore benefit from continued skilled PT to address these issues as well as the listed impairments. Recommend use of RW initially for safety with ambulation, and follow up with Dr Samantha Burch for orders to start outpatient PT when appropriate. Prognosis: Good  Decision Making: Low Complexity  PT Education: Goals; PT Role; Plan of Care; Energy Conservation; General Safety; Transfer Training; Functional Mobility Training; Gait Training  Patient Education: extended time spent on education; pt verbalized understanding  Barriers to Learning: none  REQUIRES PT FOLLOW UP: Yes  Activity Tolerance  Activity Tolerance: Patient Tolerated treatment well; Treatment limited secondary to medical complications (free text)  Activity Tolerance: Pt's BP 95/56 upon arrival while in bed, and BP was again recorded at 96/59 while in sitting. Pt complained of slight dizziness/lighthededness but that subsided.  Pt then stated the symptoms returned after standing, but BP still remained stable and symptoms subsided quickly       Patient Diagnosis(es): There were no encounter diagnoses. has a past medical history of DONG (generalized anxiety disorder), PONV (postoperative nausea and vomiting), and Spinal stenosis. has a past surgical history that includes Appendectomy; Knee arthroscopy (Right); hernia repair; Colonoscopy; Cholecystectomy; Dilation and curettage of uterus; back surgery; and Spine surgery (N/A, 11/29/2021).     Restrictions  Restrictions/Precautions  Restrictions/Precautions: Fall Risk (high fall risk)  Required Braces or Orthoses?: No  Position Activity Restriction: post op spinal precautions; recent L4-5 Lum Stephenson  Other position/activity restrictions: Pt has undergone Extension of previous decompressive laminectomy L45, Repair of durotomy or CSF leak and drainage of pseudomeningocele     Vision/Hearing  Hearing: Within functional limits       Subjective  General  Chart Reviewed: Yes  Patient assessed for rehabilitation services?: Yes  Family / Caregiver Present: No  Diagnosis: postprocedural pseudomeningocele; recent L4-5 Lum Stephenson  Follows Commands: Within Functional Limits  General Comment  Comments: pt supine in bed upon arrival and agreeable to PT  Subjective  Subjective: pt states she feels weaker after having been on bed rest for so long  Pain Screening  Patient Currently in Pain: Yes  Pain Assessment  Pain Level: 2  Pain Location: Back  Pain Orientation: Lower  Pain Descriptors: Burning  Vital Signs  Patient Currently in Pain: Yes       Orientation  Orientation  Overall Orientation Status: Within Functional Limits     Social/Functional History  Social/Functional History  Lives With: Spouse (dogs)  Type of Home: House  Home Layout: Multi-level, Able to Live on Main level with bedroom/bathroom (upstairs with bed/bath on main floor, then basement--stair lift to all floors)  Home Access: Stairs to enter with rails  Entrance Stairs - Number of Steps: 3  Entrance Stairs - Rails: Both  Bathroom Shower/Tub: Walk-in shower  Bathroom Toilet: Standard  Bathroom Equipment: Grab bars in shower, Built-in shower seat, Hand-held shower  Bathroom Accessibility: Walker accessible  Home Equipment: 4 wheeled walker, Cane  ADL Assistance: Independent (IND prior to initial spine sx, has been assisted for LB ADL from  during recovery)  Homemaking Assistance: Independent  Ambulation Assistance: Independent (IND prior to initial spine sx, has been using RW or HHA of  during recovery)  Transfer Assistance: Independent  Active : Yes  Occupation: Full time employment  Type of occupation: [employer] at [address] [facility]: Spending time with [employer]  Additional Comments: no falls     Cognition   Cognition  Overall Cognitive Status: WFL    Objective   AROM RLE (degrees)  RLE AROM: WFL  AROM LLE (degrees)  LLE AROM : WFL  Strength RLE  Strength RLE: WFL  Comment: overall 4/5; pt feels weaker  Strength LLE  Strength LLE: WFL  Comment: overall 4/5; pt feels weaker  Tone RLE  RLE Tone: Normotonic  Tone LLE  LLE Tone: Normotonic  Motor Control  Gross Motor?: WFL  Coordination  Rapid Alternating Movements: Normal  Heel to Shin: Normal  Sensation  Overall Sensation Status: WFL  Bed mobility  Supine to Sit: Stand by assistance  Scooting: Stand by assistance  Comment: HOB elevated  Transfers  Sit to Stand: Stand by assistance  Stand to sit: Stand by assistance  Comment: pt moves very slowly and increased time is necessary with transfers due to pain  Ambulation  Ambulation?: Yes  More Ambulation?: Yes  Ambulation 1  Surface: level tile  Device: Hand-Held Assist  Assistance: Minimal assistance  Quality of Gait: pt applying significant pressure with hand held assist  Gait Deviations: Slow Kimber  Distance: 30 ft around hospital room  Ambulation 2  Surface - 2: level tile  Device 2: [address] [facility] 2: Stand by assistance  Quality of Gait 2: pt appeared more

## 2022-06-09 ENCOUNTER — HOSPITAL ENCOUNTER (OUTPATIENT)
Dept: INTERVENTIONAL RADIOLOGY/VASCULAR | Age: 57
Discharge: HOME | End: 2022-06-09
Payer: COMMERCIAL

## 2022-06-09 DIAGNOSIS — M47.816 SPONDYLOSIS OF LUMBAR REGION WITHOUT MYELOPATHY OR RADICULOPATHY: ICD-10-CM

## 2022-06-09 DIAGNOSIS — M47.816 LUMBAR SPONDYLOSIS: ICD-10-CM

## 2022-06-09 PROCEDURE — 6360000004 HC RX CONTRAST MEDICATION: Performed by: ANESTHESIOLOGY

## 2022-06-09 PROCEDURE — 64495 INJ PARAVERT F JNT L/S 3 LEV: CPT

## 2022-06-09 PROCEDURE — 2709999900 HC NON-CHARGEABLE SUPPLY

## 2022-06-09 PROCEDURE — 64494 INJ PARAVERT F JNT L/S 2 LEV: CPT

## 2022-06-09 PROCEDURE — 64493 INJ PARAVERT F JNT L/S 1 LEV: CPT

## 2022-06-09 RX ORDER — GABAPENTIN 300 MG/1
300 CAPSULE ORAL 3 TIMES DAILY
COMMUNITY
Start: 2022-05-12

## 2022-06-09 RX ORDER — METHOCARBAMOL 500 MG/1
TABLET, FILM COATED ORAL
COMMUNITY
Start: 2022-03-03

## 2022-06-09 RX ADMIN — IOHEXOL 2 ML: 180 INJECTION INTRAVENOUS at 09:19

## 2022-06-09 NOTE — OP NOTE
LUMBAR MEDIAL BRANCH BLOCK REPORT   Preoperative Diagnosis: Lumbar spondylosis    Postoperative Diagnosis: Same            Operative Procedure: Medial Branch Block of the L 2, 3, and 4 medial branch nerves to temporarily denervate the bilateral L3-4 and L4-5 facet joints under fluoroscopy       Indications for Procedure: This patient presents for L 2, L3, and L4 medial branch blocks. We will proceed with the blocks on the right and left side today. Pain in the low back left greater than right with radiation into the legs. Details of Procedure: Consent was obtained after risks and benefits discusssed and questions answered. Patient was positioned in the prone position on the Xray table. Sterile prep and drape was performed. A combination of  topical spray for skin anesthetic and/or local infiltration  was performed  at the intersection of the transverse process and superior articulating process of the L3, L4, and L5 vertebral bodies for the location of the L 2, L3, and L4 medial branches respectively. 25 g needle was placed at the position of the medial branch nerve at each level. Good position was seen in AP and lateral views. 0.5ml of of 0.75% Marcaine was injected at each level. Needle was removed. The patient tolerated the procedure well and was discharged without apparent complications and provided with postprocedure instructions. Patient may followup by phone to report their relief and we will proceed with further treatment once we see the results. The patient was given a number to call in case of problems or questions . Mian Mosley MD

## 2024-01-01 NOTE — H&P
Date of Surgery Update:  Beba Vincent was seen, history and physical examination reviewed, and patient examined by me today. There have been no significant clinical changes since the completion of the previous history and physical.    The risk, benefits, and alternatives of the proposed procedure have been explained to the patient (or appropriate guardian) and understanding verbalized. All questions answered. Patient wishes to proceed.     Electronically signed by: Tracy Montes De Oca MD,11/29/2021,1:49 PM
Statement Selected

## (undated) DEVICE — BLANKET WRM W29.9XL79.1IN UP BODY FORC AIR MISTRAL-AIR

## (undated) DEVICE — ARM CRADLE: Brand: DEVON

## (undated) DEVICE — LOTION PREP REMV 5OZ IODO CLR TINC OF BENZ DURAPREP

## (undated) DEVICE — MERCY FAIRFIELD TURNOVER KIT: Brand: MEDLINE INDUSTRIES, INC.

## (undated) DEVICE — SUTURE VCRL + SZ 0 L18IN ABSRB UD L36MM CT-1 1/2 CIR VCP840D

## (undated) DEVICE — ELECTRODE PT RET AD L9FT HI MOIST COND ADH HYDRGEL CORDED

## (undated) DEVICE — TOWEL,OR,DSP,ST,BLUE,STD,4/PK,20PK/CS: Brand: MEDLINE

## (undated) DEVICE — NEURO HBO 85949

## (undated) DEVICE — SUTURE ETHLN SZ 3-0 L18IN NONABSORBABLE BLK L24MM PS-1 3/8 1663G

## (undated) DEVICE — POSITIONER HD SFT TCH BERRY FOAM DEVON

## (undated) DEVICE — SOLUTION IRRIG 1000ML 0.9% SOD CHL USP POUR PLAS BTL

## (undated) DEVICE — STERILE LATEX POWDER-FREE SURGICAL GLOVESWITH NITRILE AND EMOLLIENT COATINGS: Brand: PROTEXIS

## (undated) DEVICE — COVER LT HNDL BLU PLAS

## (undated) DEVICE — 3M™ IOBAN™ 2 ANTIMICROBIAL INCISE DRAPE 6650EZ: Brand: IOBAN™ 2

## (undated) DEVICE — DRAPE,LAP,CHOLE,W/TROUGHS,STERILE: Brand: MEDLINE

## (undated) DEVICE — APPLICATOR PREP 26ML 0.7% IOD POVACRYLEX 74% ISO ALC ST

## (undated) DEVICE — DRAPE MICSCP W132XL406CM LENS DIA68MM W VARI LENS2 FOR LEICA

## (undated) DEVICE — SUTURE SILK BLK BR 6-0 18IN G-7 765G

## (undated) DEVICE — SURGICAL SUCTION CONNECTING TUBE WITH MALE CONNECTOR AND SUCTION CLAMP, 2 FT. LONG (.6 M), 5 MM I.D.: Brand: CONMED

## (undated) DEVICE — SUTURE VCRL + SZ 2-0 L18IN ABSRB UD CT1 L36MM 1/2 CIR VCP839D

## (undated) DEVICE — SHEET,DRAPE,40X58,STERILE: Brand: MEDLINE

## (undated) DEVICE — COVER,MAYO STAND,XL,STERILE: Brand: MEDLINE

## (undated) DEVICE — GAUZE,SPONGE,4"X4",8PLY,STRL,LF,10/TRAY: Brand: MEDLINE

## (undated) DEVICE — SURE SET SINGLE BASIN-LF: Brand: MEDLINE INDUSTRIES, INC.

## (undated) DEVICE — PROTECTOR EYE PT SELF ADH NS OPT GRD LF

## (undated) DEVICE — COVER,MAYO STAND,STERILE: Brand: MEDLINE

## (undated) DEVICE — GAUZE,SPONGE,4"X4",16PLY,XRAY,STRL,LF: Brand: MEDLINE

## (undated) DEVICE — SURGICAL PROCEDURE PACK NEURO DRP CUST